# Patient Record
Sex: MALE | Race: WHITE | URBAN - METROPOLITAN AREA
[De-identification: names, ages, dates, MRNs, and addresses within clinical notes are randomized per-mention and may not be internally consistent; named-entity substitution may affect disease eponyms.]

---
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
## 2020-08-11 ENCOUNTER — APPOINTMENT (OUTPATIENT)
Dept: OTOLARYNGOLOGY | Facility: CLINIC | Age: 20
End: 2020-08-11

## 2020-09-18 PROBLEM — Z00.00 ENCOUNTER FOR PREVENTIVE HEALTH EXAMINATION: Status: ACTIVE | Noted: 2020-09-18

## 2020-09-21 ENCOUNTER — APPOINTMENT (OUTPATIENT)
Dept: OTOLARYNGOLOGY | Facility: CLINIC | Age: 20
End: 2020-09-21
Payer: COMMERCIAL

## 2020-09-21 VITALS — WEIGHT: 109 LBS | BODY MASS INDEX: 18.16 KG/M2 | HEIGHT: 65 IN

## 2020-09-21 DIAGNOSIS — Z78.9 OTHER SPECIFIED HEALTH STATUS: ICD-10-CM

## 2020-09-21 DIAGNOSIS — K21.9 GASTRO-ESOPHAGEAL REFLUX DISEASE W/OUT ESOPHAGITIS: ICD-10-CM

## 2020-09-21 DIAGNOSIS — S09.91XA UNSPECIFIED INJURY OF EAR, INITIAL ENCOUNTER: ICD-10-CM

## 2020-09-21 DIAGNOSIS — F84.0 AUTISTIC DISORDER: ICD-10-CM

## 2020-09-21 DIAGNOSIS — S00.432A CONTUSION OF LEFT EAR, INITIAL ENCOUNTER: ICD-10-CM

## 2020-09-21 DIAGNOSIS — S00.431A CONTUSION OF RIGHT EAR, INITIAL ENCOUNTER: ICD-10-CM

## 2020-09-21 PROCEDURE — 99204 OFFICE O/P NEW MOD 45 MIN: CPT | Mod: 25

## 2020-09-21 RX ORDER — TOPIRAMATE 50 MG/1
TABLET, COATED ORAL
Refills: 0 | Status: ACTIVE | COMMUNITY

## 2020-09-21 RX ORDER — DIAZEPAM 10 MG/2ML
10 GEL RECTAL
Refills: 0 | Status: ACTIVE | COMMUNITY

## 2020-09-21 RX ORDER — METRONIDAZOLE 7.5 MG/G
0.75 GEL TOPICAL
Refills: 0 | Status: ACTIVE | COMMUNITY

## 2020-09-21 RX ORDER — GABAPENTIN 300 MG/1
300 CAPSULE ORAL
Refills: 0 | Status: ACTIVE | COMMUNITY

## 2020-09-21 RX ORDER — PETROLATUM,WHITE 41 %
OINTMENT (GRAM) TOPICAL
Refills: 0 | Status: ACTIVE | COMMUNITY

## 2020-09-21 RX ORDER — RISPERIDONE 0.25 MG/1
0.25 TABLET ORAL
Refills: 0 | Status: ACTIVE | COMMUNITY

## 2020-09-21 RX ORDER — TRAZODONE HYDROCHLORIDE 100 MG/1
100 TABLET ORAL
Refills: 0 | Status: ACTIVE | COMMUNITY

## 2020-09-21 RX ORDER — OMEGA-3-ACID ETHYL ESTERS CAPSULES 1 G/1
1 CAPSULE, LIQUID FILLED ORAL
Refills: 0 | Status: ACTIVE | COMMUNITY

## 2020-09-21 NOTE — HISTORY OF PRESENT ILLNESS
[de-identified] : 09/21/2020 Patient presents today with ear hitting. Patient accompanied by aide from group [place]. Patient has autism. The care giver states that he hit his ear on a regular basis, and there is concern whether he is hitting his ear because it is bothering him or it becomes infected bc he is hitting it.  Ear becomes infected periodically, requires PO abx from his doctor about 4 times a year for about 3-4 years. Bactroban applied "very frequently,"  most recently last week. To bilateral ears.  Patient unable to communicate. Infrequent otorrhea. Has not had hearing checked in the last few years.  Mother also states he has acid reflux he is take omeprazole.

## 2020-09-21 NOTE — PHYSICAL EXAM
[de-identified] : bilateral chronic edema of pinna [Midline] : trachea located in midline position [Normal] : no rashes

## 2020-09-21 NOTE — CONSULT LETTER
[Dear  ___] : Dear ~VADIM, [Consult Letter:] : I had the pleasure of evaluating your patient, [unfilled]. [Please see my note below.] : Please see my note below. [Consult Closing:] : Thank you very much for allowing me to participate in the care of this patient.  If you have any questions, please do not hesitate to contact me. [Sincerely,] : Sincerely, [FreeTextEntry2] : [facility] [FreeTextEntry3] : Dominga Modi MD\par Otolaryngology - Head & Neck Surgery\par

## 2020-09-21 NOTE — ASSESSMENT
[FreeTextEntry1] : - no evidence of ear infction today, evidence of post-traumatic bilateral chronic edema (cauliflower ear)\par - will proceed with ABR to determine whether any hearing loss\par - f/up after ABR

## 2021-10-02 ENCOUNTER — EMERGENCY (EMERGENCY)
Facility: HOSPITAL | Age: 21
LOS: 0 days | Discharge: HOME | End: 2021-10-02
Attending: EMERGENCY MEDICINE | Admitting: EMERGENCY MEDICINE
Payer: COMMERCIAL

## 2021-10-02 VITALS
OXYGEN SATURATION: 99 % | RESPIRATION RATE: 20 BRPM | TEMPERATURE: 96 F | DIASTOLIC BLOOD PRESSURE: 65 MMHG | SYSTOLIC BLOOD PRESSURE: 130 MMHG | HEART RATE: 80 BPM

## 2021-10-02 DIAGNOSIS — S01.81XA LACERATION WITHOUT FOREIGN BODY OF OTHER PART OF HEAD, INITIAL ENCOUNTER: ICD-10-CM
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
## 2021-10-02 DIAGNOSIS — F84.0 AUTISTIC DISORDER: ICD-10-CM
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
## 2021-10-02 DIAGNOSIS — Z23 ENCOUNTER FOR IMMUNIZATION: ICD-10-CM

## 2021-10-02 PROCEDURE — 99284 EMERGENCY DEPT VISIT MOD MDM: CPT | Mod: 25

## 2021-10-02 PROCEDURE — 12011 RPR F/E/E/N/L/M 2.5 CM/<: CPT

## 2021-10-02 RX ORDER — TETANUS TOXOID, REDUCED DIPHTHERIA TOXOID AND ACELLULAR PERTUSSIS VACCINE, ADSORBED 5; 2.5; 8; 8; 2.5 [IU]/.5ML; [IU]/.5ML; UG/.5ML; UG/.5ML; UG/.5ML
0.5 SUSPENSION INTRAMUSCULAR ONCE
Refills: 0 | Status: COMPLETED | OUTPATIENT
Start: 2021-10-02 | End: 2021-10-02

## 2021-10-02 RX ORDER — MIDAZOLAM HYDROCHLORIDE 1 MG/ML
2 INJECTION, SOLUTION INTRAMUSCULAR; INTRAVENOUS ONCE
Refills: 0 | Status: DISCONTINUED | OUTPATIENT
Start: 2021-10-02 | End: 2021-10-02

## 2021-10-02 RX ORDER — MIDAZOLAM HYDROCHLORIDE 1 MG/ML
5 INJECTION, SOLUTION INTRAMUSCULAR; INTRAVENOUS ONCE
Refills: 0 | Status: DISCONTINUED | OUTPATIENT
Start: 2021-10-02 | End: 2021-10-02

## 2021-10-02 RX ADMIN — TETANUS TOXOID, REDUCED DIPHTHERIA TOXOID AND ACELLULAR PERTUSSIS VACCINE, ADSORBED 0.5 MILLILITER(S): 5; 2.5; 8; 8; 2.5 SUSPENSION INTRAMUSCULAR at 18:55

## 2021-10-02 RX ADMIN — MIDAZOLAM HYDROCHLORIDE 2 MILLIGRAM(S): 1 INJECTION, SOLUTION INTRAMUSCULAR; INTRAVENOUS at 19:42

## 2021-10-02 RX ADMIN — MIDAZOLAM HYDROCHLORIDE 2 MILLIGRAM(S): 1 INJECTION, SOLUTION INTRAMUSCULAR; INTRAVENOUS at 18:55

## 2021-10-02 RX ADMIN — MIDAZOLAM HYDROCHLORIDE 2 MILLIGRAM(S): 1 INJECTION, SOLUTION INTRAMUSCULAR; INTRAVENOUS at 19:20

## 2021-10-02 NOTE — ED ADULT TRIAGE NOTE - CHIEF COMPLAINT QUOTE
Patient BIBEMS for fall out of wheelchair today, laceration present to left cheek. No active bleeding noted at this time. Patient will not let RN fully assess at this time. Denies LOC or AC.

## 2021-10-02 NOTE — ED PROVIDER NOTE - PATIENT PORTAL LINK FT
You can access the FollowMyHealth Patient Portal offered by [facility] by registering at the following website: [email or web address]. By joining [employer]’s FollowMyHealth portal, you will also be able to view your health information using other applications (apps) compatible with our system.

## 2021-10-02 NOTE — ED PROVIDER NOTE - ATTENDING CONTRIBUTION TO CARE
20 yo m with pmh of severe autism presents with chin injury.  pt fell off wheelchair and suffered a chin wound.  no loc.  witnessed by family.  exam: chin lac, V shaped imp: pt with chin lac, will repair

## 2021-10-02 NOTE — ED PROVIDER NOTE - PHYSICAL EXAMINATION
CONSTITUTIONAL: Well-appearing; well-nourished; in no apparent distress.   EYES: PERRL; EOM intact.   ENT: normal nose; no rhinorrhea; normal pharynx with no tonsillar hypertrophy.   NECK: Supple; non-tender; no cervical lymphadenopathy.   CARDIOVASCULAR: Normal S1, S2; no murmurs, rubs, or gallops.   RESPIRATORY: Normal chest excursion with respiration; breath sounds clear and equal bilaterally; no wheezes, rhonchi, or rales.  GI/: Normal bowel sounds; non-distended; non-tender; no palpable organomegaly.   MS: No evidence of trauma or deformity distal pulses are normal.   SKIN: + 2 cm laceration to chin. No active bleeding. + small amount of gravel in wound  NEURO/PSYCH: Pt awake and alert. No focal deficits.

## 2021-10-02 NOTE — ED PROVIDER NOTE - OBJECTIVE STATEMENT
21 year old M with hx of CP, seizure d/o, autism brought in by father s/p fall. As per father was in wheelchair going down a hill when pt 21 year old M with hx of CP, seizure d/o, autism biba s/p fall. sts pt was in wheelchair going down a hill when pt fell out of wheelchair and hit chin on concrete x 1 hr ago. + lac to chin. No loc. Pt has been acting at baseline since. No vomiting, lethargy, ac use. Cannot obtain hx from pt. Unsure last tdap.

## 2022-09-12 ENCOUNTER — EMERGENCY (EMERGENCY)
Facility: HOSPITAL | Age: 22
LOS: 0 days | Discharge: HOME | End: 2022-09-12
Attending: EMERGENCY MEDICINE | Admitting: EMERGENCY MEDICINE

## 2022-09-12 VITALS
OXYGEN SATURATION: 99 % | RESPIRATION RATE: 18 BRPM | DIASTOLIC BLOOD PRESSURE: 61 MMHG | HEART RATE: 89 BPM | SYSTOLIC BLOOD PRESSURE: 153 MMHG
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
## 2022-09-12 DIAGNOSIS — S01.81XA LACERATION WITHOUT FOREIGN BODY OF OTHER PART OF HEAD, INITIAL ENCOUNTER: ICD-10-CM
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
## 2022-09-12 PROCEDURE — 12011 RPR F/E/E/N/L/M 2.5 CM/<: CPT

## 2022-09-12 PROCEDURE — 99283 EMERGENCY DEPT VISIT LOW MDM: CPT | Mod: 25

## 2022-09-12 NOTE — ED ADULT NURSE NOTE - NSIMPLEMENTINTERV_GEN_ALL_ED
Implemented All Fall Risk Interventions:  [place] to call system. Call bell, personal items and telephone within reach. Instruct patient to call for assistance. Room bathroom lighting operational. Non-slip footwear when patient is off stretcher. Physically safe environment: no spills, clutter or unnecessary equipment. Stretcher in lowest position, wheels locked, appropriate side rails in place. Provide visual cue, wrist band, yellow gown, etc. Monitor gait and stability. Monitor for mental status changes and reorient to person, place, and time. Review medications for side effects contributing to fall risk. Reinforce activity limits and safety measures with patient and family.

## 2022-09-12 NOTE — ED PROVIDER NOTE - PHYSICAL EXAMINATION
VITAL SIGNS: I have reviewed nursing notes and confirm.  CONSTITUTIONAL: well-nourished; in no acute distress.  SKIN: Skin exam is warm and dry, no acute rash.  HEAD: Normocephalic; 0.7cm chin lac.   EYES: PERRL, EOM intact; conjunctiva and sclera clear.  ENT: No nasal discharge; airway clear.   NECK: Supple; non tender.  CARD:+ S1, S2   RESP: No wheezes, rales or rhonchi.  ABD: Normal bowel sounds; soft; non-distended;   EXT: Normal ROM. No cyanosis or edema. atraumatic appearing.  LYMPH: No acute adenopathy.  NEURO: Alert. Grossly unremarkable.

## 2022-09-12 NOTE — ED PROVIDER NOTE - OBJECTIVE STATEMENT
21 yo m non verbal s/p witnessed fall, hit chin. no  head inj or loc. acting baseline. aid with patient.

## 2022-09-12 NOTE — ED ADULT TRIAGE NOTE - CHIEF COMPLAINT QUOTE
pt biba for fall from [facility]. pt is special needs, [facility] bound. pt was being transferred from [place] and fell forward hitting chin. noted lac to chin. -bt -loc.

## 2022-09-12 NOTE — ED PROVIDER NOTE - NSFOLLOWUPINSTRUCTIONS_ED_ALL_ED_FT
Your stitches are absorbable. They do not need to be removed.    Follow up with your doctor tomorrow, check if you need tetanus booster update.      Laceration    A laceration is a cut that goes through all of the layers of the skin and into the tissue that is right under the skin. Some lacerations heal on their own. Others need to be closed with skin adhesive strips, skin glue, stitches (sutures), or staples. Proper laceration care minimizes the risk of infection and helps the laceration to heal better.  If non-absorbable stitches or staples have been placed, they must be taken out within the time frame instructed by your healthcare provider.    SEEK IMMEDIATE MEDICAL CARE IF YOU HAVE ANY OF THE FOLLOWING SYMPTOMS: swelling around the wound, worsening pain, drainage from the wound, red streaking going away from your wound, inability to move finger or toe near the laceration, or discoloration of skin near the laceration.

## 2022-09-12 NOTE — ED ADULT NURSE NOTE - OBJECTIVE STATEMENT
pt biba for fall from [facility]. pt is special needs, wc bound. pt was being transferred from [place] and fell forward hitting chin. noted lac to chin. -bt -loc.

## 2022-09-12 NOTE — ED PROVIDER NOTE - PATIENT PORTAL LINK FT
You can access the FollowMyHealth Patient Portal offered by [facility] by registering at the following website: [email or web address]. By joining [employer]’s FollowMyHealth portal, you will also be able to view your health information using other applications (apps) compatible with our system.

## 2022-09-12 NOTE — ED PROVIDER NOTE - NS ED ROS FT
per aid:   CONSTITUTIONAL: Negatve   SKIN: see hpi  HEAD: Negatve   EYES: Negatve   ENT: Negatve   NECK: Negatve   CARD:Negatve   RESP:Negatve   ABD: Negatve   EXT: Negatve  LYMPH: Negatve   NEURO: Negatve

## 2022-09-12 NOTE — ED ADULT NURSE NOTE - CHIEF COMPLAINT QUOTE
pt biba for fall from [facility]. pt is special needs, wc bound. pt was being transferred from [place] and fell forward hitting chin. noted lac to chin. -bt -loc.

## 2022-11-22 NOTE — ED ADULT NURSE NOTE - NSIMPLEMENTINTERV_GEN_ALL_ED
Yes
Implemented All Universal Safety Interventions:  [place] to call system. Call bell, personal items and telephone within reach. Instruct patient to call for assistance. Room bathroom lighting operational. Non-slip footwear when patient is off stretcher. Physically safe environment: no spills, clutter or unnecessary equipment. Stretcher in lowest position, wheels locked, appropriate side rails in place.

## 2023-02-27 ENCOUNTER — APPOINTMENT (OUTPATIENT)
Dept: ORTHOPEDIC SURGERY | Facility: CLINIC | Age: 23
End: 2023-02-27
Payer: COMMERCIAL

## 2023-02-27 VITALS — BODY MASS INDEX: 23.66 KG/M2 | WEIGHT: 142 LBS | HEIGHT: 65 IN

## 2023-02-27 DIAGNOSIS — S62.661A NONDISPLACED FRACTURE OF DISTAL PHALANX OF LEFT INDEX FINGER, INITIAL ENCOUNTER FOR CLOSED FRACTURE: ICD-10-CM

## 2023-02-27 PROCEDURE — 73140 X-RAY EXAM OF FINGER(S): CPT | Mod: LT

## 2023-02-27 PROCEDURE — 99202 OFFICE O/P NEW SF 15 MIN: CPT

## 2023-02-27 NOTE — PHYSICAL EXAM
[de-identified] : Patient has swelling at the tip of the left index finger.  There is some redness present.  There is no nailbed laceration.  There is no bleeding.  Normal capillary refill.

## 2023-02-27 NOTE — HISTORY OF PRESENT ILLNESS
[de-identified] : 22-year-old male had injury to his left index finger unsure how the injury occurred comes in for the evaluation had x-rays done at an outside facility that documented a fracture of the distal phalanx left index finger

## 2023-02-27 NOTE — DATA REVIEWED
[FreeTextEntry1] : Radiographs 3 views of the left index finger reviewed documenting a left index finger distal phalanx fracture consistent with a mallet type fracture there is good alignment

## 2023-02-27 NOTE — ASSESSMENT
[FreeTextEntry1] : Patient is a left index finger distal phalanx fracture.  This fracture should heal on its own I put the patient in a splint when he took it off very quickly afterwards the case was discussed with the facility where he resides I think this fracture will heal the same with or without splinting I think it would be more problems associated with the cast on his finger or with the pin being placed more risk associated with that and that would be leaving it alone he will see us back as needed

## 2023-03-23 ENCOUNTER — APPOINTMENT (OUTPATIENT)
Dept: ORTHOPEDIC SURGERY | Facility: CLINIC | Age: 23
End: 2023-03-23
Payer: COMMERCIAL

## 2023-03-23 DIAGNOSIS — S62.631A DISPLACED FRACTURE OF DISTAL PHALANX OF LEFT INDEX FINGER, INITIAL ENCOUNTER FOR CLOSED FRACTURE: ICD-10-CM

## 2023-03-23 PROCEDURE — 99213 OFFICE O/P EST LOW 20 MIN: CPT

## 2023-03-23 PROCEDURE — 73140 X-RAY EXAM OF FINGER(S): CPT | Mod: LT

## 2023-03-23 NOTE — DATA REVIEWED
[FreeTextEntry1] : x-rays repeated in the office today of his left index finger show a displaced mallet fracture of the distal phalanx.

## 2023-03-23 NOTE — PHYSICAL EXAM
[de-identified] : Physical exam of his left index finger: He has an extensor lag of the DIP joint.  He is unable to extend at the DIP joint.  Mildly tender over the DIP joint.  He can make a full fist.

## 2023-03-23 NOTE — HISTORY OF PRESENT ILLNESS
[de-identified] : Patient is a 22-year-old male here for repeat evaluation of his left index finger.  The patient has a mental disability.  He resides in a home.  He is nonverbal.  He is accompanied by caretaker.  His mother is on the phone.  He has a mallet fracture of the left index finger.

## 2023-03-23 NOTE — DISCUSSION/SUMMARY
[de-identified] : I explained the x-ray findings to the patient's mom.\par They understand that he will be left with this extensor lag.\par Due  to the patient's stability, he is noncompliant with the splint.\par He does not appear to be in much pain.\par He will follow-up on an as-needed basis.  All questions answered today

## 2023-04-12 ENCOUNTER — EMERGENCY (EMERGENCY)
Facility: HOSPITAL | Age: 23
LOS: 0 days | Discharge: HOME | End: 2023-04-13
Attending: EMERGENCY MEDICINE
Payer: COMMERCIAL

## 2023-04-12 VITALS
OXYGEN SATURATION: 98 % | SYSTOLIC BLOOD PRESSURE: 126 MMHG | RESPIRATION RATE: 20 BRPM | HEART RATE: 62 BPM | WEIGHT: 139.99 LBS | TEMPERATURE: 98 F | DIASTOLIC BLOOD PRESSURE: 71 MMHG

## 2023-04-12 VITALS — TEMPERATURE: 98 F

## 2023-04-12 DIAGNOSIS — R56.9 UNSPECIFIED CONVULSIONS: ICD-10-CM

## 2023-04-12 DIAGNOSIS — F25.9 SCHIZOAFFECTIVE DISORDER, UNSPECIFIED: ICD-10-CM

## 2023-04-12 DIAGNOSIS — Z99.3 DEPENDENCE ON WHEELCHAIR: ICD-10-CM

## 2023-04-12 DIAGNOSIS — G40.909 EPILEPSY, UNSPECIFIED, NOT INTRACTABLE, WITHOUT STATUS EPILEPTICUS: ICD-10-CM

## 2023-04-12 DIAGNOSIS — G80.9 CEREBRAL PALSY, UNSPECIFIED: ICD-10-CM

## 2023-04-12 DIAGNOSIS — F63.9 IMPULSE DISORDER, UNSPECIFIED: ICD-10-CM

## 2023-04-12 DIAGNOSIS — F84.0 AUTISTIC DISORDER: ICD-10-CM

## 2023-04-12 DIAGNOSIS — Z87.19 PERSONAL HISTORY OF OTHER DISEASES OF THE DIGESTIVE SYSTEM: ICD-10-CM

## 2023-04-12 DIAGNOSIS — F79 UNSPECIFIED INTELLECTUAL DISABILITIES: ICD-10-CM

## 2023-04-12 LAB
ALBUMIN SERPL ELPH-MCNC: 4 G/DL — SIGNIFICANT CHANGE UP (ref 3.5–5.2)
ALP SERPL-CCNC: 92 U/L — SIGNIFICANT CHANGE UP (ref 30–115)
ALT FLD-CCNC: 16 U/L — SIGNIFICANT CHANGE UP (ref 0–41)
ANION GAP SERPL CALC-SCNC: 9 MMOL/L — SIGNIFICANT CHANGE UP (ref 7–14)
AST SERPL-CCNC: 25 U/L — SIGNIFICANT CHANGE UP (ref 0–41)
BASOPHILS # BLD AUTO: 0.06 K/UL — SIGNIFICANT CHANGE UP (ref 0–0.2)
BASOPHILS NFR BLD AUTO: 0.8 % — SIGNIFICANT CHANGE UP (ref 0–1)
BILIRUB SERPL-MCNC: 0.2 MG/DL — SIGNIFICANT CHANGE UP (ref 0.2–1.2)
BUN SERPL-MCNC: 13 MG/DL — SIGNIFICANT CHANGE UP (ref 10–20)
CALCIUM SERPL-MCNC: 8.7 MG/DL — SIGNIFICANT CHANGE UP (ref 8.4–10.4)
CHLORIDE SERPL-SCNC: 111 MMOL/L — HIGH (ref 98–110)
CO2 SERPL-SCNC: 17 MMOL/L — SIGNIFICANT CHANGE UP (ref 17–32)
CREAT SERPL-MCNC: <0.5 MG/DL — LOW (ref 0.7–1.5)
EGFR: 173 ML/MIN/1.73M2 — SIGNIFICANT CHANGE UP
EOSINOPHIL # BLD AUTO: 0.18 K/UL — SIGNIFICANT CHANGE UP (ref 0–0.7)
EOSINOPHIL NFR BLD AUTO: 2.5 % — SIGNIFICANT CHANGE UP (ref 0–8)
GLUCOSE SERPL-MCNC: 99 MG/DL — SIGNIFICANT CHANGE UP (ref 70–99)
HCT VFR BLD CALC: 41.5 % — LOW (ref 42–52)
HGB BLD-MCNC: 13.7 G/DL — LOW (ref 14–18)
IMM GRANULOCYTES NFR BLD AUTO: 0.3 % — SIGNIFICANT CHANGE UP (ref 0.1–0.3)
LACTATE SERPL-SCNC: 0.8 MMOL/L — SIGNIFICANT CHANGE UP (ref 0.7–2)
LYMPHOCYTES # BLD AUTO: 2.13 K/UL — SIGNIFICANT CHANGE UP (ref 1.2–3.4)
LYMPHOCYTES # BLD AUTO: 29.5 % — SIGNIFICANT CHANGE UP (ref 20.5–51.1)
MAGNESIUM SERPL-MCNC: 2.2 MG/DL — SIGNIFICANT CHANGE UP (ref 1.8–2.4)
MCHC RBC-ENTMCNC: 29.5 PG — SIGNIFICANT CHANGE UP (ref 27–31)
MCHC RBC-ENTMCNC: 33 G/DL — SIGNIFICANT CHANGE UP (ref 32–37)
MCV RBC AUTO: 89.4 FL — SIGNIFICANT CHANGE UP (ref 80–94)
MONOCYTES # BLD AUTO: 0.54 K/UL — SIGNIFICANT CHANGE UP (ref 0.1–0.6)
MONOCYTES NFR BLD AUTO: 7.5 % — SIGNIFICANT CHANGE UP (ref 1.7–9.3)
NEUTROPHILS # BLD AUTO: 4.28 K/UL — SIGNIFICANT CHANGE UP (ref 1.4–6.5)
NEUTROPHILS NFR BLD AUTO: 59.4 % — SIGNIFICANT CHANGE UP (ref 42.2–75.2)
NRBC # BLD: 0 /100 WBCS — SIGNIFICANT CHANGE UP (ref 0–0)
PLATELET # BLD AUTO: 141 K/UL — SIGNIFICANT CHANGE UP (ref 130–400)
PMV BLD: 10.9 FL — HIGH (ref 7.4–10.4)
POTASSIUM SERPL-MCNC: 4.2 MMOL/L — SIGNIFICANT CHANGE UP (ref 3.5–5)
POTASSIUM SERPL-SCNC: 4.2 MMOL/L — SIGNIFICANT CHANGE UP (ref 3.5–5)
PROT SERPL-MCNC: 6.6 G/DL — SIGNIFICANT CHANGE UP (ref 6–8)
RBC # BLD: 4.64 M/UL — LOW (ref 4.7–6.1)
RBC # FLD: 13.6 % — SIGNIFICANT CHANGE UP (ref 11.5–14.5)
SODIUM SERPL-SCNC: 137 MMOL/L — SIGNIFICANT CHANGE UP (ref 135–146)
TROPONIN T SERPL-MCNC: <0.01 NG/ML — SIGNIFICANT CHANGE UP
WBC # BLD: 7.21 K/UL — SIGNIFICANT CHANGE UP (ref 4.8–10.8)
WBC # FLD AUTO: 7.21 K/UL — SIGNIFICANT CHANGE UP (ref 4.8–10.8)

## 2023-04-12 PROCEDURE — 80053 COMPREHEN METABOLIC PANEL: CPT

## 2023-04-12 PROCEDURE — 99285 EMERGENCY DEPT VISIT HI MDM: CPT | Mod: 25

## 2023-04-12 PROCEDURE — 80201 ASSAY OF TOPIRAMATE: CPT

## 2023-04-12 PROCEDURE — 36415 COLL VENOUS BLD VENIPUNCTURE: CPT

## 2023-04-12 PROCEDURE — 71045 X-RAY EXAM CHEST 1 VIEW: CPT

## 2023-04-12 PROCEDURE — 96374 THER/PROPH/DIAG INJ IV PUSH: CPT

## 2023-04-12 PROCEDURE — 93010 ELECTROCARDIOGRAM REPORT: CPT

## 2023-04-12 PROCEDURE — 83735 ASSAY OF MAGNESIUM: CPT

## 2023-04-12 PROCEDURE — 84484 ASSAY OF TROPONIN QUANT: CPT

## 2023-04-12 PROCEDURE — 85025 COMPLETE CBC W/AUTO DIFF WBC: CPT

## 2023-04-12 PROCEDURE — 71045 X-RAY EXAM CHEST 1 VIEW: CPT | Mod: 26

## 2023-04-12 PROCEDURE — 93005 ELECTROCARDIOGRAM TRACING: CPT

## 2023-04-12 PROCEDURE — 83605 ASSAY OF LACTIC ACID: CPT

## 2023-04-12 PROCEDURE — 99285 EMERGENCY DEPT VISIT HI MDM: CPT

## 2023-04-12 NOTE — CONSULT NOTE ADULT - SUBJECTIVE AND OBJECTIVE BOX
Neurology Consult    Patient is a 22y old  Male with PMH of autism, intellectually disabled, nonverbal, epilepsy (on Gabapentin and Topiramate) who was brought after having multiple episodes tilting his head backward, and eyes rolling backward, each one of them less than one minutes. The patient is nonverbal and history was taken from the caregiver. The patient is at baseline as per the caregiver. No history of fever       HPI:      PAST MEDICAL & SURGICAL HISTORY:  Arthrogryposis      Seizure disorder      Schizoaffective disorder      Tourette's disorder      Autistic disorder      Cerebral palsy      Impulse control disorder      Motor apraxia          FAMILY HISTORY:      Social History: (-) x 3    Allergies    No Known Allergies    Intolerances        MEDICATIONS  (STANDING):    MEDICATIONS  (PRN):      Review of systems:    Can not be collected     Vital Signs Last 24 Hrs  T(C): 36.4 (12 Apr 2023 20:29), Max: 36.7 (12 Apr 2023 19:04)  T(F): 97.5 (12 Apr 2023 20:29), Max: 98 (12 Apr 2023 19:04)  HR: 62 (12 Apr 2023 19:04) (62 - 62)  BP: 126/71 (12 Apr 2023 19:04) (126/71 - 126/71)  BP(mean): --  RR: 20 (12 Apr 2023 19:04) (20 - 20)  SpO2: 98% (12 Apr 2023 19:04) (98% - 98%)        Examination:  The patient is nonverbal, does not follow commands, he moves his upper limbs bilaterally  both legs are malformed and not moving   No eyes deviation, or gaze preference. PERRLA           Labs:   CBC Full  -  ( 12 Apr 2023 20:26 )  WBC Count : 7.21 K/uL  RBC Count : 4.64 M/uL  Hemoglobin : 13.7 g/dL  Hematocrit : 41.5 %  Platelet Count - Automated : 141 K/uL  Mean Cell Volume : 89.4 fL  Mean Cell Hemoglobin : 29.5 pg  Mean Cell Hemoglobin Concentration : 33.0 g/dL  Auto Neutrophil # : 4.28 K/uL  Auto Lymphocyte # : 2.13 K/uL  Auto Monocyte # : 0.54 K/uL  Auto Eosinophil # : 0.18 K/uL  Auto Basophil # : 0.06 K/uL  Auto Neutrophil % : 59.4 %  Auto Lymphocyte % : 29.5 %  Auto Monocyte % : 7.5 %  Auto Eosinophil % : 2.5 %  Auto Basophil % : 0.8 %    04-12    137  |  111<H>  |  13  ----------------------------<  99  4.2   |  17  |  <0.5<L>    Ca    8.7      12 Apr 2023 20:26  Mg     2.2     04-12    TPro  6.6  /  Alb  4.0  /  TBili  0.2  /  DBili  x   /  AST  25  /  ALT  16  /  AlkPhos  92  04-12    LIVER FUNCTIONS - ( 12 Apr 2023 20:26 )  Alb: 4.0 g/dL / Pro: 6.6 g/dL / ALK PHOS: 92 U/L / ALT: 16 U/L / AST: 25 U/L / GGT: x                   Neuroimaging:  NCHCT:     04-12-23 @ 22:55

## 2023-04-12 NOTE — ED ADULT NURSE NOTE - NSIMPLEMENTINTERV_GEN_ALL_ED
Implemented All Fall Risk Interventions:  [place] to call system. Call bell, personal items and telephone within reach. Instruct patient to call for assistance. Room bathroom lighting operational. Non-slip footwear when patient is off stretcher. Physically safe environment: no spills, clutter or unnecessary equipment. Stretcher in lowest position, wheels locked, appropriate side rails in place. Provide visual cue, wrist band, yellow gown, etc. Monitor gait and stability. Monitor for mental status changes and reorient to person, place, and time. Review medications for side effects contributing to fall risk. Reinforce activity limits and safety measures with patient and family.

## 2023-04-12 NOTE — CONSULT NOTE ADULT - ASSESSMENT
This 22y old male who is intellectually challenged, with PMH of epilepsy. He had a few episodes today fo seizure like activity that is consistent with breakthrough seizures. There is no clear explanation for that, with him taking his medications as per the caregiver, no fever. The patient is back to his baseline.     Recommendations:   - Keppra 500 mg load now  - Continue Keppra 500 mg bid   - F/U with his neurologist (the care giver confirmed that they will be able to arrange a follow up with his neurologist in [place])    Case discussed with Attending Dr. Blackmon  Thank you for sharing this patient with me; please do not hesitate to contact me in case of any question.

## 2023-04-12 NOTE — ED ADULT NURSE NOTE - NSFALLRSKASSISTTYPE_ED_ALL_ED
Called and unable to reach mom, left voicemail message to contact clinic. Standing/Walking/Toileting

## 2023-04-12 NOTE — ED ADULT NURSE NOTE - NSICDXPASTMEDICALHX_GEN_ALL_CORE_FT
PAST MEDICAL HISTORY:  Arthrogryposis     Autistic disorder     Cerebral palsy     Impulse control disorder     Motor apraxia     Schizoaffective disorder     Seizure disorder     Tourette's disorder

## 2023-04-12 NOTE — ED ADULT TRIAGE NOTE - CHIEF COMPLAINT QUOTE
Pt here for x2 seizures. reports absent seizures where pt would fallback and stare off. pt awake and alert non verbal. does not appear post ictal

## 2023-04-12 NOTE — ED ADULT NURSE NOTE - OBJECTIVE STATEMENT
Pt BIBA from facility for x2 seizures. Pt had absent seizures where pt would fallback and stare off. pt is autistic, awake, and non verbal. Sitter at bedside. Bed alarm on and side rails up. Safety measure in place.

## 2023-04-13 RX ORDER — LEVETIRACETAM 250 MG/1
1 TABLET, FILM COATED ORAL
Qty: 60 | Refills: 0
Start: 2023-04-13 | End: 2023-05-12

## 2023-04-13 RX ORDER — LEVETIRACETAM 250 MG/1
500 TABLET, FILM COATED ORAL ONCE
Refills: 0 | Status: COMPLETED | OUTPATIENT
Start: 2023-04-13 | End: 2023-04-13

## 2023-04-13 RX ADMIN — LEVETIRACETAM 400 MILLIGRAM(S): 250 TABLET, FILM COATED ORAL at 01:13

## 2023-04-13 NOTE — ED PROVIDER NOTE - CLINICAL SUMMARY MEDICAL DECISION MAKING FREE TEXT BOX
Pt presented after having several absence seizures, consistent with his seizure hx. Required labs, ekg. Neurology consulted and pt cleared for outpt f/up

## 2023-04-13 NOTE — ED PROVIDER NOTE - CARE PROVIDER_API CALL
YOUR NEUROLOGIST DR. SARAVIA THIS WEEK PLEASE,   Phone: (   )    -  Fax: (   )    -  Follow Up Time:

## 2023-04-13 NOTE — ED PROVIDER NOTE - OBJECTIVE STATEMENT
23 y/o male with a PMH of cerebral palsy wheelchair bound and nonverbal, seizure disorder on topamax 200mg 2x daily and 25mg at bedtime, and nayzilam 5mg/0.1ml nasall PRN, GERD, schizoaffective disorder, tourette's, developmental delay presents to the ED for evaluation of possible seizures. I spoke with group home member who reports that pt was being wheeled around in his wheelchair outside by his nurse from 2-5pm, around 5:10pm pt nurse noticed his head go back and eyes roll back for about 60 seconds, group home staff said that he was called and at 5:20pm, 5:30pm, and 5:40pm he noticed the same episodes happen again for about 60 seconds each and each time administered nayzilam intra nasally. pt was then sent to the ED. as per group home staff at bedside pt neurologist is Dr. Lees. group home staff reports pt is at his baseline right now. group home staff reports pt has not had fevers, been sick recently or had any trauma recently.

## 2023-04-13 NOTE — ED PROVIDER NOTE - NS ED ATTENDING STATEMENT MOD
This was a shared visit with the PERFECTO. I reviewed and verified the documentation and independently performed the documented:

## 2023-04-13 NOTE — ED PROVIDER NOTE - PHYSICAL EXAMINATION
Physical Exam    Vital Signs: I have reviewed the initial vital signs.  Constitutional: appears stated age, no acute distress  Eyes: Conjunctiva pink, Sclera clear, PERRLA, EOMI without pain.  Cardiovascular: S1 and S2, regular rate, regular rhythm, well-perfused extremities, radial pulses equal and 2+ b/l.   Respiratory: unlabored respiratory effort, clear to auscultation bilaterally no wheezing, rales and rhonchi. no accessory muscle use.   Gastrointestinal: soft, non-tender, nondistended abdomen, no pulsatile mass, normal bowl sounds, no rebound, no guarding  Musculoskeletal: pt has FROM of b/l upper extremities but does not move the lower extremties.   Integumentary: warm, dry, no rash  Neurologic: awake, alert, pt is grabbing my hand and smelling my hands which group home member reprots is pt baseline and his way of showing comfort and wants to be friends. pt is also point to his wheelchair.   Psychiatric: appropriate mood, appropriate affect

## 2023-04-13 NOTE — ED PROVIDER NOTE - PATIENT PORTAL LINK FT
You can access the FollowMyHealth Patient Portal offered by [facility] by registering at the following website: [email or web address]. By joining [email or web address]’s FollowMyHealth portal, you will also be able to view your health information using other applications (apps) compatible with our system.

## 2023-04-13 NOTE — ED PROVIDER NOTE - PROGRESS NOTE DETAILS
FF; pt seen by neuro and recommendations are in neuro consult note. I discussed plan with group home member at bedside. advised to f/u with neuro this week.

## 2023-04-13 NOTE — ED PROVIDER NOTE - ATTENDING APP SHARED VISIT CONTRIBUTION OF CARE
22-year-old male with past medical history of severe autism, seizure disorder on Topamax and Nayzilam, GERD was brought in for evaluation after having several seizures.  As per health aide at the bedside patient has absence seizure and was seen to have 3 episodes each lasted about 1 minutes.  Patient was treated with Nayzilam twice, which is the rescue medication. Home health aide states that he was told that this is the patient's typical seizures.  Otherwise patient is currently at his baseline.  No trauma, fever, vomiting, coughing, rhinorrhea, difficulty breathing.  VSS, non toxic appearing, NAD, Head NCAT, MMM, neck supple, normal ROM, normal s1s2, lungs ctab, abd s/nt/nd, no guarding or rebound, extremities wnl, AAO x 3, GCS 15, neuro grossly normal. No acute skin lesions. Plan is ekg, labs, meds, imaging as needed, neuro consult and reassess.

## 2023-04-13 NOTE — ED PROVIDER NOTE - NSFOLLOWUPINSTRUCTIONS_ED_ALL_ED_FT
PATIENT STARTED ON 500MG OF KEPPRA IN THE ED AS RECOMMENDED BY NEUROLOGY     I PRESCRIBED KEPPRA 500MG TWICE DAILY FOR PATIENT TO CONTINUE. PLEASE HAVE PATIENT FOLLOW UP WITH HIS NEUROLOGIST ASAP.     Seizure    A seizure is abnormal electrical activity in the brain; the specific cause may or may not be found. Prior to a seizure you may experience a warning sensation (aura) that may include fear, nausea, dizziness, and visual changes such as flashing lights of spots. Common symptoms during the seizure may include an altered mental status, rhythmic jerking movements, drooling, grunting, loss of bladder or bowel control, or tongue biting. After a seizure, you may feel confused and sleepy.     Do not swim, drive, operate machinery, or engage in any risky activity during which a seizure could cause further injury to you or others. Teach friends and family what to do if you HAVE a seizure which includes laying you on the ground with your head on a cushion and turning you to the side to keep your breathing passages clear in case of vomiting.    SEEK IMMEDIATE MEDICAL CARE IF YOU HAVE ANY OF THE FOLLOWING SYMPTOMS: seizure lasting over 5 minutes, not waking up or persistent altered mental status after the seizure, or more frequent or worsening seizures.

## 2023-04-17 LAB — TOPIRAMATE SERPL-MCNC: 13.4 MCG/ML — SIGNIFICANT CHANGE UP

## 2024-09-23 NOTE — ED ADULT TRIAGE NOTE - ESI TRIAGE ACUITY LEVEL, MLM
Social Work-Patient tested negative for CDiff . Faxed updated labs and progress notes to [facility]. Patient will need current PT/OT notes for precert. Notified therapy. Beto   4

## 2024-10-06 ENCOUNTER — INPATIENT (INPATIENT)
Facility: HOSPITAL | Age: 24
LOS: 0 days | Discharge: HOME | DRG: 543 | End: 2024-10-07
Attending: INTERNAL MEDICINE | Admitting: INTERNAL MEDICINE
Payer: COMMERCIAL

## 2024-10-06 VITALS
DIASTOLIC BLOOD PRESSURE: 77 MMHG | SYSTOLIC BLOOD PRESSURE: 132 MMHG | RESPIRATION RATE: 16 BRPM | HEART RATE: 107 BPM | TEMPERATURE: 99 F | OXYGEN SATURATION: 99 %

## 2024-10-06 DIAGNOSIS — S42.402A UNSPECIFIED FRACTURE OF LOWER END OF LEFT HUMERUS, INITIAL ENCOUNTER FOR CLOSED FRACTURE: ICD-10-CM

## 2024-10-06 LAB
ALBUMIN SERPL ELPH-MCNC: 4.5 G/DL — SIGNIFICANT CHANGE UP (ref 3.5–5.2)
ALP SERPL-CCNC: 110 U/L — SIGNIFICANT CHANGE UP (ref 30–115)
ALT FLD-CCNC: 15 U/L — SIGNIFICANT CHANGE UP (ref 0–41)
ANION GAP SERPL CALC-SCNC: 12 MMOL/L — SIGNIFICANT CHANGE UP (ref 7–14)
AST SERPL-CCNC: 22 U/L — SIGNIFICANT CHANGE UP (ref 0–41)
BASOPHILS # BLD AUTO: 0.05 K/UL — SIGNIFICANT CHANGE UP (ref 0–0.2)
BASOPHILS NFR BLD AUTO: 0.4 % — SIGNIFICANT CHANGE UP (ref 0–1)
BILIRUB SERPL-MCNC: <0.2 MG/DL — SIGNIFICANT CHANGE UP (ref 0.2–1.2)
BUN SERPL-MCNC: 12 MG/DL — SIGNIFICANT CHANGE UP (ref 10–20)
CALCIUM SERPL-MCNC: 8.9 MG/DL — SIGNIFICANT CHANGE UP (ref 8.4–10.5)
CHLORIDE SERPL-SCNC: 105 MMOL/L — SIGNIFICANT CHANGE UP (ref 98–110)
CO2 SERPL-SCNC: 18 MMOL/L — SIGNIFICANT CHANGE UP (ref 17–32)
CREAT SERPL-MCNC: <0.5 MG/DL — LOW (ref 0.7–1.5)
EGFR: 156 ML/MIN/1.73M2 — SIGNIFICANT CHANGE UP
EOSINOPHIL # BLD AUTO: 0.04 K/UL — SIGNIFICANT CHANGE UP (ref 0–0.7)
EOSINOPHIL NFR BLD AUTO: 0.3 % — SIGNIFICANT CHANGE UP (ref 0–8)
GLUCOSE SERPL-MCNC: 118 MG/DL — HIGH (ref 70–99)
HCT VFR BLD CALC: 43.4 % — SIGNIFICANT CHANGE UP (ref 42–52)
HGB BLD-MCNC: 14.5 G/DL — SIGNIFICANT CHANGE UP (ref 14–18)
IMM GRANULOCYTES NFR BLD AUTO: 0.3 % — SIGNIFICANT CHANGE UP (ref 0.1–0.3)
LYMPHOCYTES # BLD AUTO: 0.95 K/UL — LOW (ref 1.2–3.4)
LYMPHOCYTES # BLD AUTO: 7.5 % — LOW (ref 20.5–51.1)
MCHC RBC-ENTMCNC: 29.8 PG — SIGNIFICANT CHANGE UP (ref 27–31)
MCHC RBC-ENTMCNC: 33.4 G/DL — SIGNIFICANT CHANGE UP (ref 32–37)
MCV RBC AUTO: 89.3 FL — SIGNIFICANT CHANGE UP (ref 80–94)
MONOCYTES # BLD AUTO: 0.65 K/UL — HIGH (ref 0.1–0.6)
MONOCYTES NFR BLD AUTO: 5.1 % — SIGNIFICANT CHANGE UP (ref 1.7–9.3)
NEUTROPHILS # BLD AUTO: 10.96 K/UL — HIGH (ref 1.4–6.5)
NEUTROPHILS NFR BLD AUTO: 86.4 % — HIGH (ref 42.2–75.2)
NRBC # BLD: 0 /100 WBCS — SIGNIFICANT CHANGE UP (ref 0–0)
PLATELET # BLD AUTO: 237 K/UL — SIGNIFICANT CHANGE UP (ref 130–400)
PMV BLD: 11.6 FL — HIGH (ref 7.4–10.4)
POTASSIUM SERPL-MCNC: 4.2 MMOL/L — SIGNIFICANT CHANGE UP (ref 3.5–5)
POTASSIUM SERPL-SCNC: 4.2 MMOL/L — SIGNIFICANT CHANGE UP (ref 3.5–5)
PROT SERPL-MCNC: 7.2 G/DL — SIGNIFICANT CHANGE UP (ref 6–8)
RBC # BLD: 4.86 M/UL — SIGNIFICANT CHANGE UP (ref 4.7–6.1)
RBC # FLD: 13.6 % — SIGNIFICANT CHANGE UP (ref 11.5–14.5)
SODIUM SERPL-SCNC: 135 MMOL/L — SIGNIFICANT CHANGE UP (ref 135–146)
TROPONIN T, HIGH SENSITIVITY RESULT: 9 NG/L — SIGNIFICANT CHANGE UP (ref 6–21)
WBC # BLD: 12.69 K/UL — HIGH (ref 4.8–10.8)
WBC # FLD AUTO: 12.69 K/UL — HIGH (ref 4.8–10.8)

## 2024-10-06 PROCEDURE — 70450 CT HEAD/BRAIN W/O DYE: CPT | Mod: 26,MC

## 2024-10-06 PROCEDURE — 99222 1ST HOSP IP/OBS MODERATE 55: CPT

## 2024-10-06 PROCEDURE — 95819 EEG AWAKE AND ASLEEP: CPT

## 2024-10-06 PROCEDURE — 71260 CT THORAX DX C+: CPT | Mod: 26,MC

## 2024-10-06 PROCEDURE — 0042T: CPT | Mod: MC

## 2024-10-06 PROCEDURE — 99156 MOD SED OTH PHYS/QHP 5/>YRS: CPT

## 2024-10-06 PROCEDURE — 73070 X-RAY EXAM OF ELBOW: CPT | Mod: 26,LT

## 2024-10-06 PROCEDURE — 73060 X-RAY EXAM OF HUMERUS: CPT | Mod: 26,LT

## 2024-10-06 PROCEDURE — 73090 X-RAY EXAM OF FOREARM: CPT | Mod: 26,LT

## 2024-10-06 PROCEDURE — 70498 CT ANGIOGRAPHY NECK: CPT | Mod: 26,MC

## 2024-10-06 PROCEDURE — 70496 CT ANGIOGRAPHY HEAD: CPT | Mod: 26,MC

## 2024-10-06 PROCEDURE — 73030 X-RAY EXAM OF SHOULDER: CPT | Mod: 26,LT

## 2024-10-06 PROCEDURE — 93005 ELECTROCARDIOGRAM TRACING: CPT

## 2024-10-06 PROCEDURE — 74177 CT ABD & PELVIS W/CONTRAST: CPT | Mod: 26,MC

## 2024-10-06 PROCEDURE — 72125 CT NECK SPINE W/O DYE: CPT | Mod: 26,MC

## 2024-10-06 PROCEDURE — 99285 EMERGENCY DEPT VISIT HI MDM: CPT | Mod: 25

## 2024-10-06 PROCEDURE — 93010 ELECTROCARDIOGRAM REPORT: CPT

## 2024-10-06 PROCEDURE — 73060 X-RAY EXAM OF HUMERUS: CPT | Mod: LT

## 2024-10-06 PROCEDURE — 73200 CT UPPER EXTREMITY W/O DYE: CPT | Mod: MC,LT

## 2024-10-06 RX ORDER — TOPIRAMATE 200 MG/1
200 TABLET ORAL ONCE
Refills: 0 | Status: COMPLETED | OUTPATIENT
Start: 2024-10-06 | End: 2024-10-06

## 2024-10-06 RX ORDER — GABAPENTIN 800 MG/1
500 TABLET, FILM COATED ORAL ONCE
Refills: 0 | Status: COMPLETED | OUTPATIENT
Start: 2024-10-06 | End: 2024-10-06

## 2024-10-06 RX ORDER — TOPIRAMATE 200 MG/1
25 TABLET ORAL ONCE
Refills: 0 | Status: COMPLETED | OUTPATIENT
Start: 2024-10-06 | End: 2024-10-06

## 2024-10-06 RX ORDER — KETOROLAC TROMETHAMINE 10 MG/1
15 TABLET, FILM COATED ORAL ONCE
Refills: 0 | Status: DISCONTINUED | OUTPATIENT
Start: 2024-10-06 | End: 2024-10-06

## 2024-10-06 RX ORDER — KETAMINE HYDROCHLORIDE 10 MG/ML
100 INJECTION INTRAMUSCULAR; INTRAVENOUS ONCE
Refills: 0 | Status: DISCONTINUED | OUTPATIENT
Start: 2024-10-06 | End: 2024-10-06

## 2024-10-06 RX ADMIN — KETAMINE HYDROCHLORIDE 100 MILLIGRAM(S): 10 INJECTION INTRAMUSCULAR; INTRAVENOUS at 23:46

## 2024-10-06 RX ADMIN — Medication 1 MILLIGRAM(S): at 23:41

## 2024-10-06 RX ADMIN — TOPIRAMATE 25 MILLIGRAM(S): 200 TABLET ORAL at 23:41

## 2024-10-06 RX ADMIN — TOPIRAMATE 200 MILLIGRAM(S): 200 TABLET ORAL at 23:41

## 2024-10-06 RX ADMIN — GABAPENTIN 500 MILLIGRAM(S): 800 TABLET, FILM COATED ORAL at 23:41

## 2024-10-06 RX ADMIN — KETOROLAC TROMETHAMINE 15 MILLIGRAM(S): 10 TABLET, FILM COATED ORAL at 19:48

## 2024-10-06 NOTE — H&P ADULT - NSHPPHYSICALEXAM_GEN_ALL_CORE
GENERAL: NAD, lying in bed comfortably  HEAD:  Atraumatic, normocephalic  EYES: EOMI, PERRL  NECK: Supple, trachea midline, no JVD  HEART: Regular rate and rhythm  LUNGS: Unlabored respirations.  Clear to auscultation bilaterally, no crackles, wheezing, or rhonchi  ABDOMEN: Soft, nontender, nondistended, +BS  EXTREMITIES: 2+ peripheral pulses bilaterally. No clubbing, cyanosis, or edema  NERVOUS SYSTEM:    - Mental status: awake, alert, non-verbal at baseline, does not follow commands at baseline  - CNs: intact  - Motor: patient moves RUE spontaneously, no movement of LUE and no withdrawal to painful stimuli of LUE. No movement of b/l LEs at basline.

## 2024-10-06 NOTE — ED ADULT TRIAGE NOTE - CHIEF COMPLAINT QUOTE
Non-verbal pt w/ Autism BIBA from home after home health aide noticed pt not moving his left arm @ ~ 1pm today. Pt able to roll himself on wheelchair using b/l arms, but today was not able to move left arm. Non-verbal pt w/ Autism BIBA from home after home health aide noticed pt not moving his left arm @ ~ 1pm today. Pt able to roll himself on wheelchair using b/l arms, but today was not able to move left arm. Triage UE=088. Non-verbal pt w/ Autism BIBA from home after home health aide noticed pt not moving his left arm @ ~ 2pm today. LKW 13:50. Pt able to propel self on wheelchair using b/l arms, but today was not able to move left arm. Triage SB=539. Baseline non-ambulatory. Non-verbal pt w/ Autism BIBA from home after home health aide noticed pt not moving his left arm @ ~ 2pm today. LKW 13:50. Pt able to propel self on wheelchair using b/l arms, but was unable to move left arm starting 2pm today. Triage [id number]. Baseline non-ambulatory. Code Stroke Activated.

## 2024-10-06 NOTE — ED ADULT NURSE NOTE - CHIEF COMPLAINT QUOTE
Non-verbal pt w/ Autism BIBA from home after home health aide noticed pt not moving his left arm @ ~ 2pm today. LKW 13:50. Pt able to propel self on wheelchair using b/l arms, but was unable to move left arm starting 2pm today. Triage WP=[id number]. Baseline non-ambulatory. Code Stroke Activated.

## 2024-10-06 NOTE — CONSULT NOTE ADULT - NS ATTEND AMEND GEN_ALL_CORE FT
Per aid at bedside, there were no witnessed seizures, falls, or injuries witnessed yesterday. Patient at his baseline this morning, LUE in cast s/p L humeral fx reduction. Suspect weakness 2/2 fracture, no need for further neurological workup at this time, can defer MRI Brain and EEG.

## 2024-10-06 NOTE — H&P ADULT - NSHPLABSRESULTS_GEN_ALL_CORE
LABS:                          14.5   12.69 )-----------( 237      ( 06 Oct 2024 19:28 )             43.4     10-06    135  |  105  |  12  ----------------------------<  118[H]  4.2   |  18  |  <0.5[L]    Ca    8.9      06 Oct 2024 19:28    TPro  7.2  /  Alb  4.5  /  TBili  <0.2  /  DBili  x   /  AST  22  /  ALT  15  /  AlkPhos  110  10-06

## 2024-10-06 NOTE — ED PROVIDER NOTE - PHYSICAL EXAMINATION
VITAL SIGNS: noted  CONSTITUTIONAL: Well-developed; well-nourished; in no acute distress  HEAD: Normocephalic; atraumatic  EYES: PERRL, EOM intact; conjunctiva and sclera clear  ENT: No nasal discharge;, MMM, oropharynx clear without tonsillar hypertrophy or exudates  NECK: Supple; non tender. No anterior cervical lymphadenopathy noted  CARD: S1, S2 normal; no murmurs, gallops, or rubs. Regular rate and rhythm  RESP: CTAB/L, no wheezes, rales or rhonchi  ABD: Normal bowel sounds; soft; non-distended; non-tender; no organoomegaly. No CVA tenderness  EXT: LUE focused: + ttp to left humerus. + swelling. no overlying skin changes. + bruises to left anterior shoulder/chest region. no ttp. ROM of LUE limited due to pain. NVI. distal pulses intact. sensation intact.    NEURO: Awake and alert,  No focal deficits.  SKIN: Skin exam is warm and dry, no acute rash

## 2024-10-06 NOTE — STROKE CODE NOTE - NSMDCONSULT QTN_Y FT
Pt is a 23yo male w PMH of seizures (on topamax 200mg BID and 25mg at bedtime and nayzilam .5mg/.1ml nsal), cerebral palsy, wheelchair bound, nonverbal, schizoaffective disorder, tourettes, developmental delay, BIBEMS for pt unable to move LUE. Aid states normal he uses both hands to prop himself up and moves wheelchair w both hands, however at approximately 2pm, he was noted to not be moving LUE. No other deficits noted by aid.     Physical exam:  General: No acute distress, awake and alert. ED reporting swelling to the LUE and bruising of the left clavicle.       Neurologic:  -Mental status: Awake, alert, nonverbal at baseline, does not follow commands at baseline.   -Cranial nerves:   II: Visual fields are full to threat   III, IV, VI: Extraocular movements are intact without nystagmus. Pupils equally round and reactive to light  VII: Face is symmetric with normal eye closure  Motor: Pt moving RUE spontaneously, no movement to LUE, trace finger movements and wrist extension. No movement to b/l LE (chronic) c  Sensation: Intact to noxious stimuli bilaterally.
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
## 2024-10-06 NOTE — ED ADULT NURSE NOTE - NSFALLHARMRISKINTERV_ED_ALL_ED

## 2024-10-06 NOTE — CONSULT NOTE ADULT - ASSESSMENT
23yo male w PMH of seizures (on topamax 200mg BID and 25mg at bedtime and nayzilam .5mg/.1ml nsal), cerebral palsy, wheelchair bound, nonverbal, schizoaffective disorder, tourettes, developmental delay, BIBEMS for pt unable to move LUE. On exam, pt w trace movement to fingers of LUE and wrist extension, no movement beyond the wrist, sensation intact to noxious stimuli, no withdrawal of LUE. Of note, ED notes pt has swelling of the proximal LUE and bruising to the left clavicle. CTH reports no acute pathology. Suspect symptoms may be related to injury of LUE, will obtain further imaging to rule out neurologic cause.    RECS  - MRI brain non con  - Initiate aspirin 81mg pending MRI   - MRI of brachial plexus w and wo contrast   - MRI cervical spine w and wo contrast   - rEEG  - Obtain XR of the LUE    Discussed w Dr. Baum   23yo male w PMH of seizures (on topamax 200mg BID and 25mg at bedtime and nayzilam .5mg/.1ml nsal), cerebral palsy, wheelchair bound, nonverbal, schizoaffective disorder, tourettes, developmental delay, BIBEMS for pt unable to move LUE. On exam, pt w trace movement to fingers of LUE and wrist extension, no movement beyond the wrist, sensation intact to noxious stimuli, no withdrawal of LUE. Of note, ED notes pt has swelling of the proximal LUE and bruising to the left clavicle. CTH reports no acute pathology. Suspect symptoms may be related to injury of LUE, will obtain further imaging to rule out neurologic cause. Found to have midhumeral fracture.     RECS  - MRI brain non con  - rEEG  - Continue home seizure medications, maintain magnesium between 2 and 2.5  - Treatment of fracture    Discussed w Dr. Baum

## 2024-10-06 NOTE — ED ADULT NURSE NOTE - OBJECTIVE STATEMENT
Patient is a 24 year old male complaining of left arm weakness since 1350- patient has autism and is nonverbal at baseline
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
## 2024-10-06 NOTE — H&P ADULT - HIV OFFER
----- Message from Jan Barragan MD sent at 2/4/2019  4:29 PM EST -----  Heart muscle function is good. Couple mildly leaky valves, not a problem or cause for symptoms.  Looks great Opt out
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
## 2024-10-06 NOTE — ED ADULT TRIAGE NOTE - CODE STROKE DETAILS
Examination limited due to pt nonverbal and unable to follow commands. Baseline non-ambulatory, uses wheelchair and can propel self on wheelchair.

## 2024-10-06 NOTE — H&P ADULT - HISTORY OF PRESENT ILLNESS
This is a 24-year-old male patient,  with PMHx of cerebral palsy, wheelchair-bound and non-verbal at baseline, seizure disorder, schizoaffective disorder, Tourette's syndrome, developmental delay, who was brought in to ED for inability to move LUE. Aid states that patient normally uses both hands to prop himself up and moves his wheelchair with both hands. Yet, since around 2pm today, he was noted to not be moving LUE. No other deficits noted by aid. ROS unable to be obtained from patient as he is non-verbal.    In the ED:  Vitals:  T(C): 37 (10-06-24 @ 18:36), Max: 37 (10-06-24 @ 18:36)  HR: 107 (10-06-24 @ 18:36) (107 - 107)  BP: 132/77 (10-06-24 @ 18:36) (132/77 - 132/77)  RR: 16 (10-06-24 @ 18:36) (16 - 16)  SpO2: 99% (10-06-24 @ 18:36) (99% - 99%)     Code stroke was called for new-onset LUE weakness.    CT brain stroke protocol:  No large acute territorial infarct or intracranial hemorrhage.    CT perfusion:  No perfusion deficits to suggest areas of completed infarction or at risk   territory.    CTA head/neck:  No large vessel occlusion, aneurysm, or vascular malformation.    Neurology consulted and recommended MRI brain non con, rEEG and c/w AEDs.    Labs:  Unremarkable except for mild leukocytosis 12.7    Imaging:     This is a 24-year-old male patient,  with PMHx of cerebral palsy, wheelchair-bound and non-verbal at baseline, seizure disorder, schizoaffective disorder, Tourette's syndrome, developmental delay, who was brought in to ED for inability to move LUE. Aid states that patient normally uses both hands to prop himself up and moves his wheelchair with both hands. Yet, since around 2pm today, he was noted to not be moving LUE. No other deficits noted by aid, denied any trauma or fall. ROS unable to be obtained from patient as he is non-verbal.    In the ED:  Vitals:  T(C): 37 (10-06-24 @ 18:36), Max: 37 (10-06-24 @ 18:36)  HR: 107 (10-06-24 @ 18:36) (107 - 107)  BP: 132/77 (10-06-24 @ 18:36) (132/77 - 132/77)  RR: 16 (10-06-24 @ 18:36) (16 - 16)  SpO2: 99% (10-06-24 @ 18:36) (99% - 99%)     Code stroke was called for new-onset LUE weakness.    CT brain stroke protocol:  No large acute territorial infarct or intracranial hemorrhage.    CT perfusion:  No perfusion deficits to suggest areas of completed infarction or at risk   territory.    CTA head/neck:  No large vessel occlusion, aneurysm, or vascular malformation.    Neurology consulted and recommended MRI brain non con, rEEG and c/w AEDs.    Labs:  Unremarkable except for mild leukocytosis 12.7    Imaging:  X-ray LUE:  Oblique distal humeral shaft posteriorly displaced fracture. No gross elbow joint effusion.    CT chest/abdomen/pelvis:  No definite evidence of acute thoracic, abdominal or pelvic pathology.    Ortho consulted, patient closed reduced under conscious sedation and placed in a coaptation splint. Plan for trial of non operative management. Recommended NWB LUE, pain control, keep cast/splint C/D/I, extremity icing/elevation, SW evaluation for home safety given no reported trauma.    Patient admitted to medicine for further evaluation and management.
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
## 2024-10-06 NOTE — H&P ADULT - ATTENDING COMMENTS
HPI:  This is a 24-year-old male patient,  with PMHx of cerebral palsy, wheelchair-bound and non-verbal at baseline, seizure disorder, schizoaffective disorder, Tourette's syndrome, developmental delay, who was brought in to ED for inability to move LUE. Aid states that patient normally uses both hands to prop himself up and moves his wheelchair with both hands. Yet, since around 2pm today, he was noted to not be moving LUE. No other deficits noted by aid, denied any trauma or fall. ROS unable to be obtained from patient as he is non-verbal.    In the ED:  Vitals:  T(C): 37 (10-06-24 @ 18:36), Max: 37 (10-06-24 @ 18:36)  HR: 107 (10-06-24 @ 18:36) (107 - 107)  BP: 132/77 (10-06-24 @ 18:36) (132/77 - 132/77)  RR: 16 (10-06-24 @ 18:36) (16 - 16)  SpO2: 99% (10-06-24 @ 18:36) (99% - 99%)     Code stroke was called for new-onset LUE weakness.    CT brain stroke protocol:  No large acute territorial infarct or intracranial hemorrhage.    CT perfusion:  No perfusion deficits to suggest areas of completed infarction or at risk   territory.    CTA head/neck:  No large vessel occlusion, aneurysm, or vascular malformation.    Neurology consulted and recommended MRI brain non con, rEEG and c/w AEDs.    Labs:  Unremarkable except for mild leukocytosis 12.7    Imaging:  X-ray LUE:  Oblique distal humeral shaft posteriorly displaced fracture. No gross elbow joint effusion.    CT chest/abdomen/pelvis:  No definite evidence of acute thoracic, abdominal or pelvic pathology.    Ortho consulted, patient closed reduced under conscious sedation and placed in a coaptation splint. Plan for trial of non operative management. Recommended NWB LUE, pain control, keep cast/splint C/D/I, extremity icing/elevation, SW evaluation for home safety given no reported trauma.    Patient admitted to medicine for further evaluation and management.   (06 Oct 2024 22:34)    REVIEW OF SYSTEMS: see cc/HPI   CONSTITUTIONAL: unable to obtain full ROS,  fevers or chills  EYES/ENT: No visual changes;  No vertigo or throat pain   NECK: No pain or stiffness  RESPIRATORY: No cough, wheezing, hemoptysis; No shortness of breath  CARDIOVASCULAR: unable to obtain full ROS  GASTROINTESTINAL: unable to obtain full ROS  GENITOURINARY: No hematuria, unable to obtain full ROS  NEUROLOGICAL:(+) intellectual disability/ CP No weakness, unable to obtain full ROS  SKIN: No itching, rashes  ENDO: No hyperglycemia, No thyroid disorder, No dyslipidemia   HEM: No bleeding, No easy bruising, No anemia   PSYCHE: No psychosis, (+) mood disorder, unable to obtain full ROS  MSK: No deformity, No fracture, No Joint swelling    Physical Exam:  General: WN/WD, appears to be in pain   Neurology: A&Ox1, nonfocal, follows commands, able to communicate needs to mother   Eyes: PERRLA/ EOMI  ENT/Neck: Neck supple, trachea midline, No JVD  Respiratory: CTA B/L, No wheezing, rales, rhonchi  CV: Normal rate regular rhythm, S1S2, no murmurs, rubs or gallops  Abdominal: Soft, NT, ND +BS,   Extremities: No edema, + peripheral pulses, LUE - cast in place on the LUE   Skin: No Rashes, Hematoma, Ecchymosis      A/p    NOTE INCOMPLETE   PATIENT SEEN by ATTENDING 10/6/24    Care of patient and finding on exam/ reason for admission discussed w/ the  on call and case to be referred [facility] in AM HPI:  This is a 24-year-old male patient,  with PMHx of cerebral palsy, wheelchair-bound and non-verbal at baseline, seizure disorder, schizoaffective disorder, Tourette's syndrome, developmental delay, who was brought in to ED for inability to move LUE. Aid states that patient normally uses both hands to prop himself up and moves his wheelchair with both hands. Yet, since around 2pm today, he was noted to not be moving LUE. No other deficits noted by aid, denied any trauma or fall. ROS unable to be obtained from patient as he is non-verbal.    In the ED:  Vitals:  T(C): 37 (10-06-24 @ 18:36), Max: 37 (10-06-24 @ 18:36)  HR: 107 (10-06-24 @ 18:36) (107 - 107)  BP: 132/77 (10-06-24 @ 18:36) (132/77 - 132/77)  RR: 16 (10-06-24 @ 18:36) (16 - 16)  SpO2: 99% (10-06-24 @ 18:36) (99% - 99%)     Code stroke was called for new-onset LUE weakness.    CT brain stroke protocol:  No large acute territorial infarct or intracranial hemorrhage.    CT perfusion:  No perfusion deficits to suggest areas of completed infarction or at risk   territory.    CTA head/neck:  No large vessel occlusion, aneurysm, or vascular malformation.    Neurology consulted and recommended MRI brain non con, rEEG and c/w AEDs.    Labs:  Unremarkable except for mild leukocytosis 12.7    Imaging:  X-ray LUE:  Oblique distal humeral shaft posteriorly displaced fracture. No gross elbow joint effusion.    CT chest/abdomen/pelvis:  No definite evidence of acute thoracic, abdominal or pelvic pathology.    Ortho consulted, patient closed reduced under conscious sedation and placed in a coaptation splint. Plan for trial of non operative management. Recommended NWB LUE, pain control, keep cast/splint C/D/I, extremity icing/elevation, SW evaluation for home safety given no reported trauma.    Patient admitted to medicine for further evaluation and management.   (06 Oct 2024 22:34)    REVIEW OF SYSTEMS: see cc/HPI   CONSTITUTIONAL: unable to obtain full ROS,  fevers or chills  EYES/ENT: No visual changes;  No vertigo or throat pain   NECK: No pain or stiffness  RESPIRATORY: No cough, wheezing, hemoptysis; No shortness of breath  CARDIOVASCULAR: unable to obtain full ROS  GASTROINTESTINAL: unable to obtain full ROS  GENITOURINARY: No hematuria, unable to obtain full ROS  NEUROLOGICAL:(+) intellectual disability/ CP No weakness, unable to obtain full ROS  SKIN: No itching, rashes  ENDO: No hyperglycemia, No thyroid disorder, No dyslipidemia   HEM: No bleeding, No easy bruising, No anemia   PSYCHE: No psychosis, (+) mood disorder, unable to obtain full ROS  MSK: No deformity, No fracture, No Joint swelling    Physical Exam:  General: WN/WD, appears to be in pain   Neurology: A&Ox1, nonfocal, follows commands, able to communicate needs to mother, CP present   Eyes: PERRLA/ EOMI  ENT/Neck: Neck supple, trachea midline, No JVD  Respiratory: CTA B/L, No wheezing, rales, rhonchi  CV: Normal rate regular rhythm, S1S2, no murmurs, rubs or gallops  Abdominal: Soft, NT, ND +BS,   Extremities: No edema, + peripheral pulses, LUE - cast in place on the LUE , general hypertonia /spasticity  Skin: No Rashes, Hematoma, Ecchymosis      A/p  Displaced L humeral fracture ?? mechanism unknown s/p reduction by Ortho   - admit to floor   -PRN pain Rx  -Orthopedic follow in the AM     Cerebral palsy / Intellectual disability   Schizoaffective disorder  Seizure disorder   -1:1 observation ( Aide from the group home present at bedside)  -c/w Topamax and Gabapentin   -Neurology recommended rEEG and Non-Con MRI ( patient originally presented as a STROKE CODE)    DVT prophylaxis     Care d/w mother at the bedside ( also unaware of the mechanism by which fracture occurred)     PATIENT SEEN by ATTENDING 10/6/24    Care of patient and finding on exam/ reason for admission discussed w/ the  on call and case to be referred [facility] in AM HPI:  This is a 24-year-old male patient,  with PMHx of cerebral palsy, wheelchair-bound and non-verbal at baseline, seizure disorder, schizoaffective disorder, Tourette's syndrome, developmental delay, who was brought in to ED for inability to move LUE. Aid states that patient normally uses both hands to prop himself up and moves his wheelchair with both hands. Yet, since around 2pm today, he was noted to not be moving LUE. No other deficits noted by aid, denied any trauma or fall. ROS unable to be obtained from patient as he is non-verbal.    In the ED:  Vitals:  T(C): 37 (10-06-24 @ 18:36), Max: 37 (10-06-24 @ 18:36)  HR: 107 (10-06-24 @ 18:36) (107 - 107)  BP: 132/77 (10-06-24 @ 18:36) (132/77 - 132/77)  RR: 16 (10-06-24 @ 18:36) (16 - 16)  SpO2: 99% (10-06-24 @ 18:36) (99% - 99%)     Code stroke was called for new-onset LUE weakness.    CT brain stroke protocol:  No large acute territorial infarct or intracranial hemorrhage.    CT perfusion:  No perfusion deficits to suggest areas of completed infarction or at risk   territory.    CTA head/neck:  No large vessel occlusion, aneurysm, or vascular malformation.    Neurology consulted and recommended MRI brain non con, rEEG and c/w AEDs.    Labs:  Unremarkable except for mild leukocytosis 12.7    Imaging:  X-ray LUE:  Oblique distal humeral shaft posteriorly displaced fracture. No gross elbow joint effusion.    CT chest/abdomen/pelvis:  No definite evidence of acute thoracic, abdominal or pelvic pathology.    Ortho consulted, patient closed reduced under conscious sedation and placed in a coaptation splint. Plan for trial of non operative management. Recommended NWB LUE, pain control, keep cast/splint C/D/I, extremity icing/elevation, SW evaluation for home safety given no reported trauma.    Patient admitted to medicine for further evaluation and management.   (06 Oct 2024 22:34)    REVIEW OF SYSTEMS: see cc/HPI,   CONSTITUTIONAL: unable to obtain full ROS,  fevers or chills  EYES/ENT: No visual changes;  Unable to obtain   NECK: No pain or stiffness  RESPIRATORY: No cough, wheezing, hemoptysis; No shortness of breath  CARDIOVASCULAR: unable to obtain full ROS  GASTROINTESTINAL: unable to obtain full ROS  GENITOURINARY: No hematuria, unable to obtain full ROS  NEUROLOGICAL:(+) intellectual disability/ CP No weakness, unable to obtain full ROS  SKIN: No itching, rashes  ENDO: No hyperglycemia, No thyroid disorder, No dyslipidemia   HEM: No bleeding, No easy bruising, No anemia   PSYCHE: No psychosis, (+) mood disorder, unable to obtain full ROS  MSK: (+) deformity, (+) fracture, No Joint swelling    Physical Exam:  General: WN/WD, appears to be in pain. Bedridden   Neurology: A&Ox1, nonfocal, follows commands, non-verbal but seems able to communicate needs to mother, (cerebral palsy)  Eyes: PERRLA/ EOMI  ENT/Neck: Neck supple, trachea midline, No JVD  Respiratory: CTA B/L, No wheezing, rales, rhonchi  CV: Normal rate regular rhythm, S1S2, no murmurs, rubs or gallops  Abdominal: Soft, NT, ND +BS,   Extremities: No edema, + peripheral pulses, LUE - cast in place on the LUE , general hypertonia /spasticity of the LE, externally rotated.  Skin: No Rashes, Hematoma, Ecchymosis, (+) R cauliflower ear, L lateral upper chest - two small faded bruises close to axilla      A/p  Displaced L humeral fracture ?? mechanism unknown s/p reduction by Ortho   - admit to floor   -PRN pain Rx  -Orthopedic follow in the AM     Cerebral palsy / Intellectual disability   Schizoaffective disorder  Seizure disorder   -1:1 observation ( Aide from the group home present at bedside)  -c/w Topamax and Gabapentin   -Neurology recommended rEEG and Non-Con MRI ( patient originally presented as a STROKE CODE)    DVT prophylaxis     Care d/w mother at the bedside ( also unaware of the mechanism by which fracture occurred)     Care of patient and finding on exam/ reason for admission discussed w/ the  on call and case to be referred [facility] in AM    PATIENT SEEN by ATTENDING 10/6/24

## 2024-10-06 NOTE — CONSULT NOTE ADULT - SUBJECTIVE AND OBJECTIVE BOX
**STROKE CODE CONSULT NOTE**    Last known well time/Time of onset of symptoms: 10/6/24 1300    HPI: 23yo male w PMH of seizures (on topamax 200mg BID and 25mg at bedtime and nayzilam .5mg/.1ml nsal), cerebral palsy, wheelchair bound, nonverbal, schizoaffective disorder, tourettes, developmental delay, BIBEMS for pt unable to move LUE. Aid states normal he uses both hands to prop himself up and moves wheelchair w both hands, however at approximately 2pm, he was noted to not be moving LUE. No other deficits noted by aid. ROS unable to be attained from pt due to nonverbal.         T(C): 37 (10-06-24 @ 18:36), Max: 37 (10-06-24 @ 18:36)  HR: 107 (10-06-24 @ 18:36) (107 - 107)  BP: 132/77 (10-06-24 @ 18:36) (132/77 - 132/77)  RR: 16 (10-06-24 @ 18:36) (16 - 16)  SpO2: 99% (10-06-24 @ 18:36) (99% - 99%)    PAST MEDICAL & SURGICAL HISTORY:  Arthrogryposis      Seizure disorder      Schizoaffective disorder      Tourette's disorder      Autistic disorder      Cerebral palsy      Impulse control disorder      Motor apraxia          FAMILY HISTORY:      SOCIAL HISTORY:  Denies smoking, drinking, or drug use    ROS:   see hpi    MEDICATIONS  (STANDING):    MEDICATIONS  (PRN):    Home Medications:      Allergies    No Known Allergies    Intolerances      Vital Signs Last 24 Hrs  T(C): 37 (06 Oct 2024 18:36), Max: 37 (06 Oct 2024 18:36)  T(F): 98.6 (06 Oct 2024 18:36), Max: 98.6 (06 Oct 2024 18:36)  HR: 107 (06 Oct 2024 18:36) (107 - 107)  BP: 132/77 (06 Oct 2024 18:36) (132/77 - 132/77)  BP(mean): --  RR: 16 (06 Oct 2024 18:36) (16 - 16)  SpO2: 99% (06 Oct 2024 18:36) (99% - 99%)    Parameters below as of 06 Oct 2024 18:36  Patient On (Oxygen Delivery Method): room air        Physical exam:    Physical exam:  General: No acute distress, awake and alert. ED reporting swelling to the LUE and bruising of the left clavicle.   Neurologic:  -Mental status: Awake, alert, nonverbal at baseline, does not follow commands at baseline.   -Cranial nerves:   II: Visual fields are full to threat   III, IV, VI: Extraocular movements are intact without nystagmus. Pupils equally round and reactive to light  VII: Face is symmetric with normal eye closure  Motor: Pt moving RUE spontaneously, no movement to LUE, trace finger movements and wrist extension. No withdrawal to noxious stimuli of LUE.  No movement to b/l LE (chronic) c  Sensation: Intact to noxious stimuli bilaterally.      NIHSS: 19 (for baseline deficits- new deficit is minimal movement to LUE).     Fingerstick Blood Glucose: CAPILLARY BLOOD GLUCOSE  165 (06 Oct 2024 19:42)      POCT Blood Glucose.: 165 mg/dL (06 Oct 2024 18:40)    LABS:                        14.5   12.69 )-----------( 237      ( 06 Oct 2024 19:28 )             43.4                     RADIOLOGY & ADDITIONAL STUDIES:    HCT:  IMPRESSION:    No large acute territorial infarct or intracranial hemorrhage.    CTA: pending     CTP:  pending     -----------------------------------------------------------------------------------------------------------------  IV-tenecetplase(Y/N):no                               Reason IV-tenecetplase not given: out of window

## 2024-10-07 ENCOUNTER — TRANSCRIPTION ENCOUNTER (OUTPATIENT)
Age: 24
End: 2024-10-07

## 2024-10-07 VITALS
HEART RATE: 78 BPM | DIASTOLIC BLOOD PRESSURE: 76 MMHG | TEMPERATURE: 98 F | RESPIRATION RATE: 20 BRPM | SYSTOLIC BLOOD PRESSURE: 113 MMHG | OXYGEN SATURATION: 100 %

## 2024-10-07 PROBLEM — F95.2 TOURETTE'S DISORDER: Chronic | Status: ACTIVE | Noted: 2023-04-12

## 2024-10-07 PROBLEM — F25.9 SCHIZOAFFECTIVE DISORDER, UNSPECIFIED: Chronic | Status: ACTIVE | Noted: 2023-04-12

## 2024-10-07 PROBLEM — G40.909 EPILEPSY, UNSPECIFIED, NOT INTRACTABLE, WITHOUT STATUS EPILEPTICUS: Chronic | Status: ACTIVE | Noted: 2023-04-12

## 2024-10-07 PROBLEM — G80.9 CEREBRAL PALSY, UNSPECIFIED: Chronic | Status: ACTIVE | Noted: 2023-04-12

## 2024-10-07 PROBLEM — Q68.8 OTHER SPECIFIED CONGENITAL MUSCULOSKELETAL DEFORMITIES: Chronic | Status: ACTIVE | Noted: 2023-04-12

## 2024-10-07 PROBLEM — R48.2 APRAXIA: Chronic | Status: ACTIVE | Noted: 2023-04-12

## 2024-10-07 PROBLEM — F63.9 IMPULSE DISORDER, UNSPECIFIED: Chronic | Status: ACTIVE | Noted: 2023-04-12

## 2024-10-07 PROBLEM — F84.0 AUTISTIC DISORDER: Chronic | Status: ACTIVE | Noted: 2023-04-12

## 2024-10-07 PROCEDURE — 95816 EEG AWAKE AND DROWSY: CPT | Mod: 26

## 2024-10-07 PROCEDURE — 99222 1ST HOSP IP/OBS MODERATE 55: CPT

## 2024-10-07 PROCEDURE — 73060 X-RAY EXAM OF HUMERUS: CPT | Mod: 26,LT

## 2024-10-07 PROCEDURE — 99239 HOSP IP/OBS DSCHRG MGMT >30: CPT

## 2024-10-07 PROCEDURE — 73200 CT UPPER EXTREMITY W/O DYE: CPT | Mod: 26,LT

## 2024-10-07 RX ORDER — MORPHINE SULFATE 30 MG/1
2 TABLET, FILM COATED, EXTENDED RELEASE ORAL ONCE
Refills: 0 | Status: DISCONTINUED | OUTPATIENT
Start: 2024-10-07 | End: 2024-10-07

## 2024-10-07 RX ORDER — GABAPENTIN 800 MG/1
200 TABLET, FILM COATED ORAL AT BEDTIME
Refills: 0 | Status: DISCONTINUED | OUTPATIENT
Start: 2024-10-07 | End: 2024-10-07

## 2024-10-07 RX ORDER — TOPIRAMATE 200 MG/1
25 TABLET ORAL AT BEDTIME
Refills: 0 | Status: DISCONTINUED | OUTPATIENT
Start: 2024-10-07 | End: 2024-10-07

## 2024-10-07 RX ORDER — 5-HYDROXYTRYPTOPHAN (5-HTP) 100 MG
3 TABLET,DISINTEGRATING ORAL AT BEDTIME
Refills: 0 | Status: DISCONTINUED | OUTPATIENT
Start: 2024-10-07 | End: 2024-10-07

## 2024-10-07 RX ORDER — MIDAZOLAM HCL 1 MG/ML
1 VIAL (ML) INJECTION
Refills: 0 | DISCHARGE

## 2024-10-07 RX ORDER — TOPIRAMATE 200 MG/1
200 TABLET ORAL
Refills: 0 | Status: DISCONTINUED | OUTPATIENT
Start: 2024-10-07 | End: 2024-10-07

## 2024-10-07 RX ORDER — GABAPENTIN 800 MG/1
300 TABLET, FILM COATED ORAL THREE TIMES A DAY
Refills: 0 | Status: DISCONTINUED | OUTPATIENT
Start: 2024-10-07 | End: 2024-10-07

## 2024-10-07 RX ORDER — MUPIROCIN 20 MG/G
1 OINTMENT TOPICAL
Refills: 0 | DISCHARGE

## 2024-10-07 RX ORDER — ENOXAPARIN SODIUM 150 MG/ML
40 INJECTION SUBCUTANEOUS EVERY 24 HOURS
Refills: 0 | Status: DISCONTINUED | OUTPATIENT
Start: 2024-10-07 | End: 2024-10-07

## 2024-10-07 RX ORDER — PANTOPRAZOLE SODIUM 40 MG/1
40 TABLET, DELAYED RELEASE ORAL
Refills: 0 | Status: DISCONTINUED | OUTPATIENT
Start: 2024-10-07 | End: 2024-10-07

## 2024-10-07 RX ORDER — 5-HYDROXYTRYPTOPHAN (5-HTP) 100 MG
1 TABLET,DISINTEGRATING ORAL
Refills: 0 | DISCHARGE

## 2024-10-07 RX ORDER — TOPIRAMATE 200 MG/1
1 TABLET ORAL
Refills: 0 | DISCHARGE

## 2024-10-07 RX ORDER — FERROUS FUM/VIT C/B12/STOMC
1 CAPSULE ORAL
Refills: 0 | DISCHARGE

## 2024-10-07 RX ORDER — GABAPENTIN 800 MG/1
1 TABLET, FILM COATED ORAL
Refills: 0 | DISCHARGE

## 2024-10-07 RX ORDER — GABAPENTIN 800 MG/1
2 TABLET, FILM COATED ORAL
Refills: 0 | DISCHARGE

## 2024-10-07 RX ADMIN — Medication 1 APPLICATION(S): at 06:59

## 2024-10-07 RX ADMIN — GABAPENTIN 300 MILLIGRAM(S): 800 TABLET, FILM COATED ORAL at 07:00

## 2024-10-07 RX ADMIN — MORPHINE SULFATE 2 MILLIGRAM(S): 30 TABLET, FILM COATED, EXTENDED RELEASE ORAL at 00:43

## 2024-10-07 RX ADMIN — PANTOPRAZOLE SODIUM 40 MILLIGRAM(S): 40 TABLET, DELAYED RELEASE ORAL at 07:00

## 2024-10-07 RX ADMIN — GABAPENTIN 300 MILLIGRAM(S): 800 TABLET, FILM COATED ORAL at 15:26

## 2024-10-07 RX ADMIN — Medication 0.75 MILLIGRAM(S): at 12:04

## 2024-10-07 RX ADMIN — TOPIRAMATE 200 MILLIGRAM(S): 200 TABLET ORAL at 07:00

## 2024-10-07 NOTE — DISCHARGE NOTE PROVIDER - HOSPITAL COURSE
This is a 24-year-old male patient,  with PMHx of cerebral palsy, wheelchair-bound and non-verbal at baseline, seizure disorder, schizoaffective disorder, Tourette's syndrome, developmental delay, who was brought in to ED for inability to move LUE, and was found to have a L midhumeral fracture.    #Left displaced distal humeral shaft fracture, non-traumatic in nature  - patient was brought in for inability to move LUE since 2pm today, wheelchair-bound at baseline but uses both UEs  - denies any recent trauma/fall  - code stroke called in ED, CTH/CTA/CTP negative  - neurology consulted: rEEG performed, No need for MRI per neuro, c/w home seizure meds  - X-ray L humerus showed oblique distal humeral shaft posteriorly displaced fracture, no gross elbow joint effusion  - ortho consulted: performed closed reduction under conscious sedation in ED and placed a coaptation splint, plan for trial of non-operative management, mother and legal guardian would like to opt for non surgical management of humerus fracture at this time, will follow up outpatient with dr. zhong next week for repeat imaging   cont NWB LUE   maintain splint care- keep clean dry and intact   - Pain control PRN  - Extremity icing/elevation  - CT chest/abdomen/pelvis: negative  - SW consult for evaluation of home safety given no reported trauma     #Leukocytosis  - WBC 12.7 on admission  - likely reactive   - patient stable, afebrile, no signs of infection    #Hx of seizure disorder   - patient on Topamax and Gabapentin  - c/w home AEDs  - neurology consulted:     #Hx of schizoaffective disorder  #Cerebral palsy  - c/w home meds    #MISC  - DVT prophylaxis: Lovenox  - GI prophylaxis: Protonix  - Diet: regular  - Activity: patient is wheelchair-bound at baseline - NWB LUE as per ortho  - Code: full code  - Med rec: confirmed with group home nurse Quiana ([phone])    Pendings: f/u rEEG, CT elbow read pending, SW consult, pain control as needed.    Pt anticipated for discharge. Discharge discussed and approved by Dr. Joce Motta.   This is a 24-year-old male patient,  with PMHx of cerebral palsy, wheelchair-bound and non-verbal at baseline, seizure disorder, schizoaffective disorder, Tourette's syndrome, developmental delay, who was brought in to ED for inability to move LUE, and was found to have a L midhumeral fracture.    #Left displaced distal humeral shaft fracture, non-traumatic in nature  - patient was brought in for inability to move LUE since 2pm today, wheelchair-bound at baseline but uses both UEs  - denies any recent trauma/fall  - code stroke called in ED, CTH/CTA/CTP negative  - neurology consulted: rEEG performed, No need for MRI per neuro, c/w home seizure meds  - X-ray L humerus showed oblique distal humeral shaft posteriorly displaced fracture, no gross elbow joint effusion  - ortho consulted: performed closed reduction under conscious sedation in ED and placed a coaptation splint, plan for trial of non-operative management, mother and legal guardian would like to opt for non surgical management of humerus fracture at this time, will follow up outpatient with dr. zhong next week for repeat imaging   cont NWB LUE   maintain splint care- keep clean dry and intact   - Pain control PRN  - Extremity icing/elevation  - CT chest/abdomen/pelvis: negative  - SW consult for evaluation of home safety given no reported trauma     #Leukocytosis, minimal , no fever  - WBC 12.7 on admission  - likely reactive   - patient stable, afebrile, no signs of infection    #Hx of seizure disorder   - patient on Topamax and Gabapentin  - c/w home AEDs  - neurology consulted:     #Hx of schizoaffective disorder  #Cerebral palsy  - c/w home meds    #MISC  - DVT prophylaxis: Lovenox  - GI prophylaxis: Protonix  - Diet: regular  - Activity: patient is wheelchair-bound at baseline - NWB LUE as per ortho  - Code: full code  - Med rec: confirmed with group home nurse Quiana ([phone])    mother wants patient to be discharged to group home     Pt anticipated for discharge. Discharge discussed and approved by Dr. Joce Motta.

## 2024-10-07 NOTE — DISCHARGE NOTE PROVIDER - NSDCMRMEDTOKEN_GEN_ALL_CORE_FT
Aquaphor topical ointment: Apply topically to affected area 2 times a day apply to ears  gabapentin 100 mg oral capsule: 2 cap(s) orally once a day (at bedtime)  gabapentin 300 mg oral capsule: 1 cap(s) orally 3 times a day  melatonin 3 mg oral tablet: 1 tab(s) orally once a day (at bedtime)  midazolam 5 mg/inh nasal spray: 1 spray(s) intranasally PRN for seizures lasting longer than 5 minutes  multivitamin with iron: 1 tab(s) once a day  mupirocin 2% topical ointment: Apply topically to affected area once a day apply to nostrils  omeprazole 20 mg oral delayed release tablet: 1 tab(s) orally 2 times a day  risperiDONE 0.25 mg oral tablet: 3 tab(s) orally once a day  risperiDONE 1 mg oral tablet: 1 tab(s) orally once a day (at bedtime)  Topamax 200 mg oral tablet: 1 tab(s) orally 2 times a day  Topamax 25 mg oral tablet: 1 tab(s) orally once a day (at bedtime)  traZODone 50 mg oral tablet: orally once a day (at bedtime)

## 2024-10-07 NOTE — CHART NOTE - NSCHARTNOTEFT_GEN_A_CORE
Discussed with patients mother and legal guardian  would like to opt for non surgical management of humerus fracture at this time   will follow up outpatient with  next week for repeat imaging   cont NWB LUE   maintain splint care- keep clean dry and intact   dicsussed with  and medical resident- all in agreement with plan

## 2024-10-07 NOTE — DISCHARGE NOTE PROVIDER - ATTENDING DISCHARGE PHYSICAL EXAMINATION:
Patient seen at bedside. aid present/. talked to family on phone as well. family wants patient to be discharged today. s per ortho family does not want any surgical intervention. advised to follow up withy ortho as outpatient and PCP . family in agreement with discharge planing. advised to go to ed in case of new or worsening symptoms

## 2024-10-07 NOTE — PATIENT PROFILE ADULT - FALL HARM RISK - HARM RISK INTERVENTIONS

## 2024-10-07 NOTE — EEG REPORT - NS EEG TEXT BOX
[facility] of Neurology  Inpatient Routine-EEG Report      Patient Name:	CHAIM MUNOZ    :	-  MRN:	-  Study Date/Time:	10/7/2024, 12:54:06 PM  Referred by:	-    Brief Clinical History:  CHAIM MUNOZ is a - old Male; study performed to investigate for seizures or markers of epilepsy.   Diagnosis Code: R40.4 Transient alteration of awareness    Patient Medication:  PROTONIX    LOVENOX    GABAPENTIN    TOAMAX    TRAZODONE    RISPERDAL      Acquisition Details:  Electroencephalography was acquired using a minimum of 21 channels on an "[employer] Neurology system v 9.3.1 with electrode placement according to the standard International 10-20 system following ACNS (American Clinical Neurophysiology Society) guidelines.  Anterior temporal T1 and T2 electrodes were utilized whenever possible.   The [employer] automated spike & seizure detections were all reviewed in detail, in addition to the entire raw EEG.    Findings:  Background:  continuous.   Voltage:  Normal (20uV)  Organization:  Rudimentary  Posterior Dominant Rhythm:  <7 Hz symmetric, well-organized, and well-modulated  Variability:  Yes	Reactivity:  Yes  Sleep:  Absent.  Focal abnormalities:  No persistent asymmetries of voltage or frequency.  Interictal Activity:  None  Focal Slowing:  None  Generalized Slowing:  Moderate  Events:  1)	No electrographic seizures or significant clinical events.  Provocations:  1)	Hyperventilation: was not performed.  2)	Photic stimulation: was not performed.  Impression:  Abnormal due to generalized slowing as above    Clinical Correlation:  Findings consistent with diffuse electrocerebral dysfunction secondary to nonspecific etiology    Jeff Puente MD  Attending Neurologist, Division of Epilepsy

## 2024-10-07 NOTE — DISCHARGE NOTE PROVIDER - PROVIDER TOKENS
PROVIDER:[TOKEN:[[id number]],FOLLOWUP:[1 week],ESTABLISHEDPATIENT:[T]],PROVIDER:[TOKEN:[[id number]],FOLLOWUP:[2 weeks],ESTABLISHEDPATIENT:[T]],PROVIDER:[TOKEN:[[id number]],FOLLOWUP:[2 weeks],ESTABLISHEDPATIENT:[T]]

## 2024-10-07 NOTE — PROGRESS NOTE ADULT - ASSESSMENT
This is a 24-year-old male patient,  with PMHx of cerebral palsy, wheelchair-bound and non-verbal at baseline, seizure disorder, schizoaffective disorder, Tourette's syndrome, developmental delay, who was brought in to ED for inability to move LUE, and was found to have a L midhumeral fracture.    #Left displaced distal humeral shaft fracture, non-traumatic in nature  - patient was brought in for inability to move LUE since 2pm today, wheelchair-bound at baseline but uses both UEs  - denies any recent trauma/fall  - code stroke called in ED, CTH/CTA/CTP negative  - neurology consulted: recommended MRI brain non con, rEEG, c/w home seizure meds, keep Mg between 2 and 2.5  - 10/7spoken to mother about pt Hx of surgeries, Patricia lyn, mother unsure about number of surgeries son has had,She mentions Hx of spinal surgery for scoliosis, for which metal implants were placed. [facility] Dr. Weston Jiang (), mother unsure whether these implants are MRI compatible. Will need these records to obtain the MRI  - X-ray L humerus showed oblique distal humeral shaft posteriorly displaced fracture, no gross elbow joint effusion  - ortho consulted: performed closed reduction under conscious sedation in ED and placed a coaptation splint, plan for trial of non-operative management, follow up ortho note  - NWB LUE  - Pain control PRN  - Extremity icing/elevation  - CT chest/abdomen/pelvis: negative  - SW consult for evaluation of home safety given no reported trauma     #Leukocytosis  - WBC 12.7 on admission  - likely reactive   - patient stable, afebrile, no signs of infection  - monitor CBC at 4pm    #Hx of seizure disorder   - patient on Topamax and Gabapentin  - c/w home AEDs  - obtain AED levels  - neurology consulted: recommended rEEG, MRI brain non con    #Hx of schizoaffective disorder  #Cerebral palsy  - c/w home meds    #MISC  - DVT prophylaxis: Lovenox  - GI prophylaxis: Protonix  - Diet: regular  - Activity: patient is wheelchair-bound at baseline - NWB LUE as per ortho  - Code: full code  - Med rec: confirmed with group home nurse Quiana ([phone])    Pendings: f/u rEEG, review need for MRI, f/u AED levels and PM labs, SW consult, pain control as needed

## 2024-10-07 NOTE — DISCHARGE NOTE NURSING/CASE MANAGEMENT/SOCIAL WORK - PATIENT PORTAL LINK FT
You can access the FollowMyHealth Patient Portal offered by [facility] by registering at the following website: [email or web address]. By joining [employer]’s FollowMyHealth portal, you will also be able to view your health information using other applications (apps) compatible with our system.

## 2024-10-07 NOTE — PROGRESS NOTE ADULT - SUBJECTIVE AND OBJECTIVE BOX
24H events:    Patient is a 24y old Male who presents with a chief complaint of LUE weakness (06 Oct 2024 22:34)    Primary diagnosis of Closed fracture of left distal humerus    Today is 1d of hospitalization. This morning patient was seen and examined at bedside, resting comfortably in bed.    No acute or major events overnight. Pt is nonverbal at baseline and nonverbal on my exam. Attempted to elicit a history from bedside sit.      PAST MEDICAL & SURGICAL HISTORY  Arthrogryposis    Seizure disorder    Schizoaffective disorder    Tourette's disorder    Autistic disorder    Cerebral palsy    Impulse control disorder    Motor apraxia      SOCIAL HISTORY:  Social History:  Denies smoking, alcohol, or drug use (06 Oct 2024 22:34)      ALLERGIES:  No Known Allergies    MEDICATIONS:  STANDING MEDICATIONS  enoxaparin Injectable 40 milliGRAM(s) SubCutaneous every 24 hours  gabapentin 300 milliGRAM(s) Oral three times a day  gabapentin 200 milliGRAM(s) Oral at bedtime  melatonin 3 milliGRAM(s) Oral at bedtime  mineral oil/petrolatum Hydrophilic Ointment 1 Application(s) Topical two times a day  pantoprazole    Tablet 40 milliGRAM(s) Oral before breakfast  risperiDONE   Tablet 0.75 milliGRAM(s) Oral daily  risperiDONE   Tablet 1 milliGRAM(s) Oral at bedtime  topiramate 200 milliGRAM(s) Oral two times a day  topiramate 25 milliGRAM(s) Oral at bedtime  traZODone 50 milliGRAM(s) Oral at bedtime    PRN MEDICATIONS    VITALS:   T(F): 98.6  HR: 80  BP: 103/68  RR: 18  SpO2: 98%    PHYSICAL EXAM:  GENERAL: NAD, lying in bed comfortably  HEAD:  Atraumatic, normocephalic  EYES: EOMI, PERRL  NECK: Supple, trachea midline, no JVD  HEART: Regular rate and rhythm  LUNGS: Unlabored respirations.  Clear to auscultation bilaterally, no crackles, wheezing, or rhonchi  ABDOMEN: Soft, nontender, nondistended, +BS  EXTREMITIES: 2+ peripheral pulses on right arm, and lower extremities. No clubbing, cyanosis, or edema, left upper extremity seen wrapped.  NERVOUS SYSTEM:    - Mental status: awake, alert, non-verbal at baseline, does not follow commands at baseline  - CNs: intact  - Motor: patient moves RUE spontaneously,  left upper extremity seen wrapped with coaptation splint    LABS:                        14.5   12.69 )-----------( 237      ( 06 Oct 2024 19:28 )             43.4     10-06    135  |  105  |  12  ----------------------------<  118[H]  4.2   |  18  |  <0.5[L]    Ca    8.9      06 Oct 2024 19:28    TPro  7.2  /  Alb  4.5  /  TBili  <0.2  /  DBili  x   /  AST  22  /  ALT  15  /  AlkPhos  110  10-06      Urinalysis Basic - ( 06 Oct 2024 19:28 )    Color: x / Appearance: x / SG: x / pH: x  Gluc: 118 mg/dL / Ketone: x  / Bili: x / Urobili: x   Blood: x / Protein: x / Nitrite: x   Leuk Esterase: x / RBC: x / WBC x   Sq Epi: x / Non Sq Epi: x / Bacteria: x

## 2024-10-07 NOTE — ED PROCEDURE NOTE - NS_POSTPROCCAREGUIDE_ED_ALL_ED
Patient is now fully awake, with vital signs and temperature stable, hydration is adequate, patients Sue’s  score is at baseline (or greater than 8), patient and escort has received  discharge education.

## 2024-10-07 NOTE — DISCHARGE NOTE PROVIDER - CARE PROVIDERS DIRECT ADDRESSES
,DirectAddress_Unknown,[email or web address],DirectAddress_Unknown

## 2024-10-07 NOTE — DISCHARGE NOTE PROVIDER - NSDCFUADDINST_GEN_ALL_CORE_FT
Activity - Weight Bearing Status:     Additional Instructions:  Non weight bearing Left Upper Extremity

## 2024-10-07 NOTE — DISCHARGE NOTE PROVIDER - NSDCCPCAREPLAN_GEN_ALL_CORE_FT
PRINCIPAL DISCHARGE DIAGNOSIS  Diagnosis: Closed fracture of left distal humerus  Assessment and Plan of Treatment: You were admitted due to immobility in your left upper extremity, diagnosed with a left displaced distal humeral shaft fracture. Treatment involved closed reduction and splint application, and afterwards opting for non-operative management per your guardian's decision. Outpatient follow-up with orthopedics is scheduled for next week to reassess the fracture. You will continue your regular medications. We emphasize non-weight bearing on the affected limb, splint care, and regular monitoring for pain and safety at home. You are stable to be discharged and will be given appointments with your PCP, Neurology and Orthopedics.

## 2024-10-07 NOTE — DISCHARGE NOTE PROVIDER - CARE PROVIDER_API CALL
Dante Valencia  Orthopaedic Surgery  [address] [place] [address]  Phone: [phone]  Fax: [phone]  Established Patient  Follow Up Time: 1 week    GERARDO CRENSHAW  [address] [place] [address]  Phone: ()-  Fax: ()-  Established Patient  Follow Up Time: 2 weeks    Sung Prince  Neurology  [address] [place] [address], [facility] [[email or web address]] [address]  [place] [address]  Phone: [phone]  Fax: [phone]  Established Patient  Follow Up Time: 2 weeks

## 2024-10-11 ENCOUNTER — APPOINTMENT (OUTPATIENT)
Dept: ORTHOPEDIC SURGERY | Facility: CLINIC | Age: 24
End: 2024-10-11

## 2024-10-11 DIAGNOSIS — S42.492A OTHER DISPLACED FRACTURE OF LOWER END OF LEFT HUMERUS, INITIAL ENCOUNTER FOR CLOSED FRACTURE: ICD-10-CM

## 2024-10-11 DIAGNOSIS — S62.661A NONDISPLACED FRACTURE OF DISTAL PHALANX OF LEFT INDEX FINGER, INITIAL ENCOUNTER FOR CLOSED FRACTURE: ICD-10-CM

## 2024-10-11 PROCEDURE — 24500 CLTX HUMRL SHFT FX W/O MNPJ: CPT

## 2024-10-11 PROCEDURE — 99203 OFFICE O/P NEW LOW 30 MIN: CPT | Mod: 25

## 2024-10-15 DIAGNOSIS — G40.909 EPILEPSY, UNSPECIFIED, NOT INTRACTABLE, WITHOUT STATUS EPILEPTICUS: ICD-10-CM

## 2024-10-15 DIAGNOSIS — Z99.3 DEPENDENCE ON WHEELCHAIR: ICD-10-CM

## 2024-10-15 DIAGNOSIS — G80.9 CEREBRAL PALSY, UNSPECIFIED: ICD-10-CM

## 2024-10-15 DIAGNOSIS — M84.422A PATHOLOGICAL FRACTURE, LEFT HUMERUS, INITIAL ENCOUNTER FOR FRACTURE: ICD-10-CM

## 2024-10-15 DIAGNOSIS — K21.9 GASTRO-ESOPHAGEAL REFLUX DISEASE WITHOUT ESOPHAGITIS: ICD-10-CM

## 2024-10-15 DIAGNOSIS — R62.50 UNSPECIFIED LACK OF EXPECTED NORMAL PHYSIOLOGICAL DEVELOPMENT IN CHILDHOOD: ICD-10-CM

## 2024-10-15 DIAGNOSIS — F25.9 SCHIZOAFFECTIVE DISORDER, UNSPECIFIED: ICD-10-CM

## 2024-10-15 DIAGNOSIS — F63.9 IMPULSE DISORDER, UNSPECIFIED: ICD-10-CM

## 2024-10-15 DIAGNOSIS — F84.0 AUTISTIC DISORDER: ICD-10-CM

## 2024-10-15 DIAGNOSIS — Q68.8 OTHER SPECIFIED CONGENITAL MUSCULOSKELETAL DEFORMITIES: ICD-10-CM

## 2024-10-15 DIAGNOSIS — F95.2 TOURETTE'S DISORDER: ICD-10-CM

## 2024-10-31 ENCOUNTER — APPOINTMENT (OUTPATIENT)
Dept: ORTHOPEDIC SURGERY | Facility: CLINIC | Age: 24
End: 2024-10-31
Payer: COMMERCIAL

## 2024-10-31 DIAGNOSIS — S42.302A UNSPECIFIED FRACTURE OF SHAFT OF HUMERUS, LEFT ARM, INITIAL ENCOUNTER FOR CLOSED FRACTURE: ICD-10-CM

## 2024-10-31 PROCEDURE — 99024 POSTOP FOLLOW-UP VISIT: CPT

## 2024-10-31 PROCEDURE — 73060 X-RAY EXAM OF HUMERUS: CPT | Mod: LT

## 2024-12-03 ENCOUNTER — APPOINTMENT (OUTPATIENT)
Dept: ORTHOPEDIC SURGERY | Facility: CLINIC | Age: 24
End: 2024-12-03
Payer: COMMERCIAL

## 2024-12-03 DIAGNOSIS — S42.302A UNSPECIFIED FRACTURE OF SHAFT OF HUMERUS, LEFT ARM, INITIAL ENCOUNTER FOR CLOSED FRACTURE: ICD-10-CM

## 2024-12-03 PROCEDURE — 99024 POSTOP FOLLOW-UP VISIT: CPT

## 2024-12-03 PROCEDURE — 73060 X-RAY EXAM OF HUMERUS: CPT | Mod: LT

## 2025-01-04 NOTE — H&P ADULT - NSVTERISKREFERASSESS_GEN_ALL_CORE
Problem: Adult Inpatient Plan of Care  Goal: Plan of Care Review  Outcome: Progressing  Goal: Patient-Specific Goal (Individualized)  Outcome: Progressing  Goal: Absence of Hospital-Acquired Illness or Injury  Outcome: Progressing  Goal: Optimal Comfort and Wellbeing  Outcome: Progressing  Goal: Readiness for Transition of Care  Outcome: Progressing     Problem: Bariatric Environmental Safety  Goal: Safety Maintained with Care  Outcome: Progressing     Problem: Diabetes Comorbidity  Goal: Blood Glucose Level Within Targeted Range  Outcome: Progressing     Problem: Infection  Goal: Absence of Infection Signs and Symptoms  Outcome: Progressing     Problem: Wound  Goal: Optimal Coping  Outcome: Progressing  Goal: Optimal Functional Ability  Outcome: Progressing  Goal: Absence of Infection Signs and Symptoms  Outcome: Progressing  Goal: Improved Oral Intake  Outcome: Progressing  Goal: Optimal Pain Control and Function  Outcome: Progressing  Goal: Skin Health and Integrity  Outcome: Progressing  Goal: Optimal Wound Healing  Outcome: Progressing      Refer to the Assessment tab to view/cancel completed assessment.

## 2025-02-18 ENCOUNTER — APPOINTMENT (OUTPATIENT)
Dept: ORTHOPEDIC SURGERY | Facility: CLINIC | Age: 25
End: 2025-02-18
Payer: COMMERCIAL

## 2025-02-18 DIAGNOSIS — S42.302A UNSPECIFIED FRACTURE OF SHAFT OF HUMERUS, LEFT ARM, INITIAL ENCOUNTER FOR CLOSED FRACTURE: ICD-10-CM

## 2025-02-18 PROCEDURE — 99213 OFFICE O/P EST LOW 20 MIN: CPT

## 2025-02-18 PROCEDURE — 73060 X-RAY EXAM OF HUMERUS: CPT | Mod: LT
